# Patient Record
Sex: FEMALE | Race: WHITE | NOT HISPANIC OR LATINO
[De-identification: names, ages, dates, MRNs, and addresses within clinical notes are randomized per-mention and may not be internally consistent; named-entity substitution may affect disease eponyms.]

---

## 2017-01-18 ENCOUNTER — RX RENEWAL (OUTPATIENT)
Age: 22
End: 2017-01-18

## 2017-03-20 ENCOUNTER — APPOINTMENT (OUTPATIENT)
Dept: ENDOCRINOLOGY | Facility: CLINIC | Age: 22
End: 2017-03-20

## 2017-03-20 VITALS
OXYGEN SATURATION: 99 % | HEART RATE: 65 BPM | SYSTOLIC BLOOD PRESSURE: 122 MMHG | WEIGHT: 120 LBS | BODY MASS INDEX: 23.56 KG/M2 | DIASTOLIC BLOOD PRESSURE: 80 MMHG | HEIGHT: 60 IN

## 2017-03-20 LAB
GLUCOSE BLDC GLUCOMTR-MCNC: 170
HBA1C MFR BLD HPLC: 7

## 2017-03-21 ENCOUNTER — OTHER (OUTPATIENT)
Age: 22
End: 2017-03-21

## 2017-05-26 ENCOUNTER — RX RENEWAL (OUTPATIENT)
Age: 22
End: 2017-05-26

## 2017-07-12 ENCOUNTER — APPOINTMENT (OUTPATIENT)
Dept: ENDOCRINOLOGY | Facility: CLINIC | Age: 22
End: 2017-07-12

## 2017-07-12 VITALS — OXYGEN SATURATION: 99 % | HEIGHT: 60 IN | HEART RATE: 64 BPM

## 2017-07-12 VITALS — WEIGHT: 123 LBS | SYSTOLIC BLOOD PRESSURE: 116 MMHG | DIASTOLIC BLOOD PRESSURE: 80 MMHG | BODY MASS INDEX: 24.02 KG/M2

## 2017-07-12 LAB
ALBUMIN SERPL ELPH-MCNC: 4.4 G/DL
ALP BLD-CCNC: 74 U/L
ALT SERPL-CCNC: 20 U/L
ANION GAP SERPL CALC-SCNC: 16 MMOL/L
AST SERPL-CCNC: 21 U/L
BASOPHILS # BLD AUTO: 0.1 K/UL
BASOPHILS NFR BLD AUTO: 1.5 %
BILIRUB SERPL-MCNC: 0.4 MG/DL
BUN SERPL-MCNC: 9 MG/DL
CALCIUM SERPL-MCNC: 10.1 MG/DL
CHLORIDE SERPL-SCNC: 100 MMOL/L
CHOLEST SERPL-MCNC: 189 MG/DL
CHOLEST/HDLC SERPL: 2.1 RATIO
CO2 SERPL-SCNC: 27 MMOL/L
CREAT SERPL-MCNC: 0.88 MG/DL
CREAT SPEC-SCNC: 104 MG/DL
EOSINOPHIL # BLD AUTO: 0.24 K/UL
EOSINOPHIL NFR BLD AUTO: 3.5 %
GLUCOSE SERPL-MCNC: 129 MG/DL
HBA1C MFR BLD HPLC: 7.6 %
HCT VFR BLD CALC: 41 %
HDLC SERPL-MCNC: 89 MG/DL
HGB BLD-MCNC: 12.8 G/DL
IMM GRANULOCYTES NFR BLD AUTO: 0.1 %
LDLC SERPL CALC-MCNC: 91 MG/DL
LYMPHOCYTES # BLD AUTO: 2.31 K/UL
LYMPHOCYTES NFR BLD AUTO: 33.6 %
MAN DIFF?: NORMAL
MCHC RBC-ENTMCNC: 25.7 PG
MCHC RBC-ENTMCNC: 31.2 GM/DL
MCV RBC AUTO: 82.2 FL
MICROALBUMIN 24H UR DL<=1MG/L-MCNC: 0.6 MG/DL
MICROALBUMIN/CREAT 24H UR-RTO: 6 MG/G
MONOCYTES # BLD AUTO: 0.5 K/UL
MONOCYTES NFR BLD AUTO: 7.3 %
NEUTROPHILS # BLD AUTO: 3.72 K/UL
NEUTROPHILS NFR BLD AUTO: 54 %
PLATELET # BLD AUTO: 333 K/UL
POTASSIUM SERPL-SCNC: 5.2 MMOL/L
PROT SERPL-MCNC: 7.7 G/DL
RBC # BLD: 4.99 M/UL
RBC # FLD: 14.8 %
SODIUM SERPL-SCNC: 143 MMOL/L
T4 FREE SERPL-MCNC: 1.3 NG/DL
TRIGL SERPL-MCNC: 46 MG/DL
TSH SERPL-ACNC: 1.41 UIU/ML
TTG IGA SER IA-ACNC: 50.9 UNITS
TTG IGA SER-ACNC: POSITIVE
WBC # FLD AUTO: 6.88 K/UL

## 2017-07-19 ENCOUNTER — RX RENEWAL (OUTPATIENT)
Age: 22
End: 2017-07-19

## 2017-07-24 ENCOUNTER — RX RENEWAL (OUTPATIENT)
Age: 22
End: 2017-07-24

## 2017-08-08 ENCOUNTER — APPOINTMENT (OUTPATIENT)
Dept: INTERNAL MEDICINE | Facility: CLINIC | Age: 22
End: 2017-08-08
Payer: COMMERCIAL

## 2017-08-08 VITALS
SYSTOLIC BLOOD PRESSURE: 121 MMHG | DIASTOLIC BLOOD PRESSURE: 79 MMHG | BODY MASS INDEX: 23.22 KG/M2 | WEIGHT: 123 LBS | HEIGHT: 61 IN | OXYGEN SATURATION: 99 % | HEART RATE: 106 BPM

## 2017-08-08 VITALS — HEART RATE: 92 BPM

## 2017-08-08 PROCEDURE — 99395 PREV VISIT EST AGE 18-39: CPT

## 2017-11-22 ENCOUNTER — RX RENEWAL (OUTPATIENT)
Age: 22
End: 2017-11-22

## 2017-11-24 ENCOUNTER — MEDICATION RENEWAL (OUTPATIENT)
Age: 22
End: 2017-11-24

## 2017-12-28 ENCOUNTER — APPOINTMENT (OUTPATIENT)
Dept: ENDOCRINOLOGY | Facility: CLINIC | Age: 22
End: 2017-12-28
Payer: COMMERCIAL

## 2017-12-28 VITALS
HEIGHT: 61 IN | WEIGHT: 119 LBS | OXYGEN SATURATION: 98 % | DIASTOLIC BLOOD PRESSURE: 78 MMHG | HEART RATE: 81 BPM | BODY MASS INDEX: 22.47 KG/M2 | SYSTOLIC BLOOD PRESSURE: 118 MMHG

## 2017-12-28 LAB
GLUCOSE BLDC GLUCOMTR-MCNC: 151
HBA1C MFR BLD HPLC: 7.4

## 2017-12-28 PROCEDURE — 83036 HEMOGLOBIN GLYCOSYLATED A1C: CPT | Mod: QW

## 2017-12-28 PROCEDURE — 82962 GLUCOSE BLOOD TEST: CPT

## 2017-12-28 PROCEDURE — 99214 OFFICE O/P EST MOD 30 MIN: CPT

## 2018-03-19 ENCOUNTER — APPOINTMENT (OUTPATIENT)
Dept: ENDOCRINOLOGY | Facility: CLINIC | Age: 23
End: 2018-03-19
Payer: COMMERCIAL

## 2018-03-19 VITALS
HEART RATE: 73 BPM | BODY MASS INDEX: 22.09 KG/M2 | WEIGHT: 117 LBS | SYSTOLIC BLOOD PRESSURE: 110 MMHG | DIASTOLIC BLOOD PRESSURE: 70 MMHG | HEIGHT: 61 IN | OXYGEN SATURATION: 98 %

## 2018-03-19 LAB
GLUCOSE BLDC GLUCOMTR-MCNC: 207
HBA1C MFR BLD HPLC: 7.1

## 2018-03-19 PROCEDURE — 82962 GLUCOSE BLOOD TEST: CPT

## 2018-03-19 PROCEDURE — 83036 HEMOGLOBIN GLYCOSYLATED A1C: CPT | Mod: QW

## 2018-03-19 PROCEDURE — 99214 OFFICE O/P EST MOD 30 MIN: CPT | Mod: 25

## 2018-07-02 ENCOUNTER — APPOINTMENT (OUTPATIENT)
Dept: ENDOCRINOLOGY | Facility: CLINIC | Age: 23
End: 2018-07-02
Payer: COMMERCIAL

## 2018-07-02 VITALS
DIASTOLIC BLOOD PRESSURE: 80 MMHG | SYSTOLIC BLOOD PRESSURE: 112 MMHG | HEIGHT: 61 IN | BODY MASS INDEX: 22.84 KG/M2 | HEART RATE: 84 BPM | OXYGEN SATURATION: 96 % | WEIGHT: 121 LBS

## 2018-07-02 LAB
GLUCOSE BLDC GLUCOMTR-MCNC: 94
HBA1C MFR BLD HPLC: 7.8

## 2018-07-02 PROCEDURE — 99214 OFFICE O/P EST MOD 30 MIN: CPT

## 2018-07-03 LAB
ALBUMIN SERPL ELPH-MCNC: 4.4 G/DL
ALP BLD-CCNC: 64 U/L
ALT SERPL-CCNC: 27 U/L
ANION GAP SERPL CALC-SCNC: 17 MMOL/L
AST SERPL-CCNC: 41 U/L
BILIRUB SERPL-MCNC: 0.7 MG/DL
BUN SERPL-MCNC: 12 MG/DL
CALCIUM SERPL-MCNC: 10.3 MG/DL
CHLORIDE SERPL-SCNC: 98 MMOL/L
CHOLEST SERPL-MCNC: 179 MG/DL
CHOLEST/HDLC SERPL: 2.1 RATIO
CO2 SERPL-SCNC: 23 MMOL/L
CREAT SERPL-MCNC: 0.89 MG/DL
GLUCOSE SERPL-MCNC: 82 MG/DL
HDLC SERPL-MCNC: 85 MG/DL
LDLC SERPL CALC-MCNC: 84 MG/DL
POTASSIUM SERPL-SCNC: 4.4 MMOL/L
PROT SERPL-MCNC: 7.7 G/DL
SODIUM SERPL-SCNC: 138 MMOL/L
T4 FREE SERPL-MCNC: 1.4 NG/DL
TRIGL SERPL-MCNC: 48 MG/DL
TSH SERPL-ACNC: 1.85 UIU/ML
TTG IGA SER IA-ACNC: 86 UNITS
TTG IGA SER-ACNC: POSITIVE

## 2018-11-21 ENCOUNTER — APPOINTMENT (OUTPATIENT)
Dept: ENDOCRINOLOGY | Facility: CLINIC | Age: 23
End: 2018-11-21
Payer: COMMERCIAL

## 2018-11-21 VITALS
WEIGHT: 118 LBS | OXYGEN SATURATION: 98 % | SYSTOLIC BLOOD PRESSURE: 120 MMHG | HEART RATE: 70 BPM | HEIGHT: 61 IN | DIASTOLIC BLOOD PRESSURE: 80 MMHG | BODY MASS INDEX: 22.28 KG/M2

## 2018-11-21 PROCEDURE — 99214 OFFICE O/P EST MOD 30 MIN: CPT

## 2018-11-21 RX ORDER — BLOOD-GLUCOSE METER
KIT MISCELLANEOUS
Qty: 500 | Refills: 1 | Status: ACTIVE | COMMUNITY
Start: 2018-11-21 | End: 1900-01-01

## 2018-11-26 LAB
GLUCOSE BLDC GLUCOMTR-MCNC: 163
HBA1C MFR BLD HPLC: 7.2

## 2019-02-28 ENCOUNTER — RX RENEWAL (OUTPATIENT)
Age: 24
End: 2019-02-28

## 2019-03-18 ENCOUNTER — APPOINTMENT (OUTPATIENT)
Dept: ENDOCRINOLOGY | Facility: CLINIC | Age: 24
End: 2019-03-18
Payer: COMMERCIAL

## 2019-03-18 VITALS
BODY MASS INDEX: 21.9 KG/M2 | HEIGHT: 61 IN | SYSTOLIC BLOOD PRESSURE: 112 MMHG | OXYGEN SATURATION: 99 % | WEIGHT: 116 LBS | DIASTOLIC BLOOD PRESSURE: 80 MMHG | HEART RATE: 62 BPM

## 2019-03-18 LAB
GLUCOSE BLDC GLUCOMTR-MCNC: 95
HBA1C MFR BLD HPLC: 7.6

## 2019-03-18 PROCEDURE — 99214 OFFICE O/P EST MOD 30 MIN: CPT

## 2019-03-18 NOTE — ASSESSMENT
[FreeTextEntry1] : Ms. IVANA CHANEY is 24 year old woman w/ Type 1 DM and celiac disease: \par \par #! Type 1 DM\par A1c 7.6%, above target of less <7.0 but improved. FS running higher towards dinner time.  Will adjust ICR during lunch today.  She does have hypoglycemia in the setting of missing meals in the morning as well.  \par Short Acting Insulin: Novolog \par Basal Rate: \par Start Time: 0:00 Basal: 1.2 units/hour \par Start Time: 0500 Basal: 1.15 units/hour \par Start Time: 0800 Basal: 1.1 units/hour \par Start Time: 1500 Basal: 1.25 units/hour \par Start Time: 1800 Basal: 1.3 units/hour \par Carbohydate ratio:\par 0:00  10\par 11:00 12\par 14:00  9      \par Correction Insulin: (Blood Glucose Minus Target) divided by sensitivity factor \par BG Target = 100-115 \par Insulin Sensitivity Factor = 55 \par Insulin on Board (IOB) Duration = 4 hours \par Pt. counseled on dietary modification and eating carb consistent diet. Instructed to incorporate in exercise most days of the week for at least 30 min.\par Diabetic foot exam Up to date (3/2018)\par Diabetic eye exam up to date. \par Spoke about Medtronc 670G close loop system as well as Dexcom G6 and T-slim close loop system.  She will look into it. \par \par #2 Autoimmune disease screening\par Check TSH, free T4 next visit\par \par #3 Celiac disease\par Stable.  \par \par RTC 3 mths\par  [Carbohydrate Consistent Diet] : carbohydrate consistent diet [Hypoglycemia Management] : hypoglycemia management [Glucagon Use] : glucagon use [Ketone Testing] : ketone testing [Long Term Vascular Complications] : long term vascular complications of diabetes [Diabetes Foot Care] : diabetes foot care [Importance of Diet and Exercise] : importance of diet and exercise to improve glycemic control, achieve weight loss and improve cardiovascular health [Action and use of Insulin] : action and use of short and long-acting insulin [Insulin Self-Administration] : insulin self-administration [Self Monitoring of Blood Glucose] : self monitoring of blood glucose [Injection Technique, Storage, Sharps Disposal] : injection technique, storage, and sharps disposal [Retinopathy Screening] : Patient was referred to ophthalmology for retinopathy screening

## 2019-03-18 NOTE — THERAPY
[Novolog] : Novolog [_____] :  [unfilled] units/hour [BG Target = ____] : BG Target = [unfilled] [Insulin Sensitivity Factor = ____] : Insulin Sensitivity Factor = [unfilled] [Insulin on Board (IOB) Duration = ____ hours] : Insulin on Board (IOB) Duration  = [unfilled] hours

## 2019-03-18 NOTE — HISTORY OF PRESENT ILLNESS
[FreeTextEntry1] : 24 y.o. woman w/ DM1 here for f/u. She was diagnosed with Type 1 DM since 13 months of ago.  \par She has been on the medtronic pump since 2007.  She has been checking FS's TID: (few readings on the pumps), states that she doesn't enter it sometimes onto the pump.  Is home from college during spring break.  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 \par She is graduating this year.  She watches her diet. She eats 3 meals a day. Breakfast usually a yogurt and coffee. Lunch is usually salad and soup. She'll snack on an apple. Dinner is usually a sandwich. \par \par She is up to date with opthalmology.  She states she has no active issues with  her feet.  \par \par She has a older generation of Dexcom Sensor but she doesn't like to use.  She has not look into newer sensor yet but did expressed interest. \par \par Regarding celiac disease, she has been avoiding gluten and is doing well.  No exacerbation.

## 2019-03-19 LAB
CREAT SPEC-SCNC: 23 MG/DL
MICROALBUMIN 24H UR DL<=1MG/L-MCNC: <1.2 MG/DL
MICROALBUMIN/CREAT 24H UR-RTO: NORMAL MG/G

## 2019-04-11 ENCOUNTER — RX RENEWAL (OUTPATIENT)
Age: 24
End: 2019-04-11

## 2019-06-04 ENCOUNTER — RX RENEWAL (OUTPATIENT)
Age: 24
End: 2019-06-04

## 2019-06-14 ENCOUNTER — RX RENEWAL (OUTPATIENT)
Age: 24
End: 2019-06-14

## 2019-07-08 ENCOUNTER — OTHER (OUTPATIENT)
Age: 24
End: 2019-07-08

## 2019-07-09 LAB
T4 FREE SERPL-MCNC: 1.1 NG/DL
TSH SERPL-ACNC: 2.36 UIU/ML

## 2019-07-14 LAB
25(OH)D3 SERPL-MCNC: 26.4 NG/ML
ALBUMIN SERPL ELPH-MCNC: 4.7 G/DL
ALP BLD-CCNC: 67 U/L
ALT SERPL-CCNC: 18 U/L
ANION GAP SERPL CALC-SCNC: 18 MMOL/L
AST SERPL-CCNC: 28 U/L
BILIRUB SERPL-MCNC: 1 MG/DL
BUN SERPL-MCNC: 8 MG/DL
CALCIUM SERPL-MCNC: 10.1 MG/DL
CHLORIDE SERPL-SCNC: 102 MMOL/L
CHOLEST SERPL-MCNC: 205 MG/DL
CHOLEST/HDLC SERPL: 1.9 RATIO
CO2 SERPL-SCNC: 22 MMOL/L
CREAT SERPL-MCNC: 0.72 MG/DL
GLUCOSE SERPL-MCNC: 60 MG/DL
HDLC SERPL-MCNC: 110 MG/DL
LDLC SERPL CALC-MCNC: 81 MG/DL
POTASSIUM SERPL-SCNC: 5.8 MMOL/L
PROT SERPL-MCNC: 7.3 G/DL
SODIUM SERPL-SCNC: 142 MMOL/L
TRIGL SERPL-MCNC: 69 MG/DL

## 2019-07-15 ENCOUNTER — APPOINTMENT (OUTPATIENT)
Dept: ENDOCRINOLOGY | Facility: CLINIC | Age: 24
End: 2019-07-15
Payer: COMMERCIAL

## 2019-07-15 VITALS
HEIGHT: 61 IN | WEIGHT: 117 LBS | HEART RATE: 70 BPM | SYSTOLIC BLOOD PRESSURE: 120 MMHG | DIASTOLIC BLOOD PRESSURE: 80 MMHG | OXYGEN SATURATION: 98 % | BODY MASS INDEX: 22.09 KG/M2

## 2019-07-15 PROCEDURE — 99214 OFFICE O/P EST MOD 30 MIN: CPT

## 2019-07-17 ENCOUNTER — APPOINTMENT (OUTPATIENT)
Dept: ENDOCRINOLOGY | Facility: CLINIC | Age: 24
End: 2019-07-17
Payer: COMMERCIAL

## 2019-07-17 PROCEDURE — G0108 DIAB MANAGE TRN  PER INDIV: CPT

## 2019-07-17 NOTE — PHYSICAL EXAM
[Alert] : alert [No Acute Distress] : no acute distress [Well Nourished] : well nourished [Well Developed] : well developed [Normal Sclera/Conjunctiva] : normal sclera/conjunctiva [EOMI] : extra ocular movement intact [No Proptosis] : no proptosis [Normal Oropharynx] : the oropharynx was normal [Thyroid Not Enlarged] : the thyroid was not enlarged [No Thyroid Nodules] : there were no palpable thyroid nodules [No Respiratory Distress] : no respiratory distress [No Accessory Muscle Use] : no accessory muscle use [Clear to Auscultation] : lungs were clear to auscultation bilaterally [Normal Rate] : heart rate was normal  [Normal S1, S2] : normal S1 and S2 [Regular Rhythm] : with a regular rhythm [Pedal Pulses Normal] : the pedal pulses are present [No Edema] : there was no peripheral edema [Normal Bowel Sounds] : normal bowel sounds [Not Tender] : non-tender [Soft] : abdomen soft [Not Distended] : not distended [Post Cervical Nodes] : posterior cervical nodes [Anterior Cervical Nodes] : anterior cervical nodes [Axillary Nodes] : axillary nodes [No Spinal Tenderness] : no spinal tenderness [Spine Straight] : spine straight [No Stigmata of Cushings Syndrome] : no stigmata of cushings syndrome [Normal Gait] : normal gait [Normal Strength/Tone] : muscle strength and tone were normal [No Rash] : no rash [Normal Reflexes] : deep tendon reflexes were 2+ and symmetric [No Tremors] : no tremors [Oriented x3] : oriented to person, place, and time [Right Foot Was Examined] : right foot ~C was examined [Left Foot Was Examined] : left foot ~C was examined [Normal] : normal [Normal Sensation on Monofilament Testing] : normal sensation on monofilament testing of lower extremities [Acanthosis Nigricans] : no acanthosis nigricans

## 2019-07-17 NOTE — HISTORY OF PRESENT ILLNESS
[FreeTextEntry1] : 24 y.o. woman w/ DM1 here for f/u. She was diagnosed with Type 1 DM since 13 months of age.   \par She has been on the medtronic pump since 2007.  She has been checking FS's 4X/day. 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  She watches her diet, is gluten free. She eats 3 meals a day. Breakfast usually a yogurt and coffee. Lunch is usually salad and soup. She'll snack on an apple. Dinner is usually a sandwich, salad. Does not like to wear CGM as does not want to have another device.\par AM BG- 50s-70s. Sometimes as low as 40s in AM and has symptoms.\par pre-lunch- 120\par pre-dinner- 160s\par pre-bedtime 100-120s\par \par Last optho, >1 year ago, needs to make f/u appt.  She states she has no active issues with  her feet.  \par \par Has ketone strips, needs new glucagon. Has novolog at home.\par Regarding celiac disease, she has been avoiding gluten and is doing well.  No exacerbation.

## 2019-07-17 NOTE — REVIEW OF SYSTEMS
[Fatigue] : no fatigue [Decreased Appetite] : appetite not decreased [Recent Weight Gain (___ Lbs)] : no recent weight gain [Recent Weight Loss (___ Lbs)] : no recent weight loss [Blurry Vision] : no blurred vision [Dry Eyes] : no dryness of the eyes [Dysphagia] : no dysphagia [Dysphonia] : no dysphonia [Neck Pain] : no neck pain [Nasal Congestion] : no nasal congestion [Chest Pain] : no chest pain [Palpitations] : no palpitations [Heart Rate Is Slow] : the heart rate was not slow [Heart Rate Is Fast] : the heart rate was not fast [Lower Ext Edema] : no lower extremity edema [Shortness Of Breath] : no shortness of breath [Wheezing] : no wheezing was heard [Cough] : no cough [SOB on Exertion] : no shortness of breath during exertion [Nausea] : no nausea [Vomiting] : no vomiting was observed [Constipation] : no constipation [Diarrhea] : no diarrhea [Abdominal Pain] : no abdominal pain [Polyuria] : no polyuria [Dysuria] : no dysuria [Joint Pain] : no joint pain [Joint Stiffness] : no joint stiffness [Hair Loss] : no hair loss [Dry Skin] : no dry skin [Headache] : no headaches [Tremors] : no tremors [Dizziness] : no dizziness [Pain/Numbness of Digits] : no pain/numbness of digits [Polydipsia] : no polydipsia [Cold Intolerance] : cold tolerant [Heat Intolerance] : heat tolerant

## 2019-07-17 NOTE — ASSESSMENT
[Carbohydrate Consistent Diet] : carbohydrate consistent diet [Hypoglycemia Management] : hypoglycemia management [Glucagon Use] : glucagon use [Ketone Testing] : ketone testing [Diabetes Foot Care] : diabetes foot care [Long Term Vascular Complications] : long term vascular complications of diabetes [Importance of Diet and Exercise] : importance of diet and exercise to improve glycemic control, achieve weight loss and improve cardiovascular health [Action and use of Insulin] : action and use of short and long-acting insulin [Retinopathy Screening] : Patient was referred to ophthalmology for retinopathy screening [FreeTextEntry1] : Ms. IVANA CHANEY is 24 year old woman w/ Type 1 DM and celiac disease: \par \par #! Type 1 DM\par A1c 7.1% today, last was 7.6 in March 2019. \par Has been having hypoglycemia in AM, so will adjust overnight basal settings. Also with pre-dinner hyperglycemia so will adjust afternoon ICR as follows:\par Short Acting Insulin: Novolog \par Basal Rate: \par Start Time: 0:00 Basal: 1.10 units/hour \par Start Time: 0500 Basal: 1.10 units/hour \par Start Time: 0800 Basal: 1.05 units/hour \par Start Time: 1500 Basal: 1.25 units/hour \par Start Time: 1800 Basal: 1.3 units/hour \par Carbohydate ratio:\par 0:00  10\par 11:00 10\par 14:00  9      \par Correction Insulin: (Blood Glucose Minus Target) divided by sensitivity factor \par BG Target = 100-115 \par Insulin Sensitivity Factor = 55 \par Insulin on Board (IOB) Duration = 4 hours \par Pt. counseled on dietary modification and eating carb consistent diet. Instructed to incorporate in exercise most days of the week for at least 30 min.\par Diabetic foot exam Up to date (7/2019)\par Diabetic eye exam >1 year ago- pt to follow-up\par Pt to be meeting with CDE for new pump. Decided on Medtronic 630G.\par CMP recently done, Crt WNL, K 5.8, likely hemolyzed. Will repeat today.\par \par #2 Autoimmune disease screening\par TSH, Ft4 WNL\par \par #3 Celiac disease\par Stable. 25 vitamin D 26 \par \par RTC 3-4 mths\par \par D/W Dr. Easton\par

## 2019-07-18 ENCOUNTER — RX RENEWAL (OUTPATIENT)
Age: 24
End: 2019-07-18

## 2019-07-18 LAB
ANION GAP SERPL CALC-SCNC: 13 MMOL/L
BUN SERPL-MCNC: 9 MG/DL
CALCIUM SERPL-MCNC: 9.8 MG/DL
CHLORIDE SERPL-SCNC: 98 MMOL/L
CO2 SERPL-SCNC: 24 MMOL/L
CREAT SERPL-MCNC: 0.71 MG/DL
GLUCOSE BLDC GLUCOMTR-MCNC: 104
GLUCOSE BLDC GLUCOMTR-MCNC: 63
GLUCOSE SERPL-MCNC: 201 MG/DL
HBA1C MFR BLD HPLC: 7.1
POTASSIUM SERPL-SCNC: 5.4 MMOL/L
SODIUM SERPL-SCNC: 135 MMOL/L

## 2019-07-18 RX ORDER — BLOOD SUGAR DIAGNOSTIC
STRIP MISCELLANEOUS
Qty: 500 | Refills: 1 | Status: ACTIVE | COMMUNITY
Start: 2018-11-21 | End: 1900-01-01

## 2019-11-18 ENCOUNTER — APPOINTMENT (OUTPATIENT)
Dept: ENDOCRINOLOGY | Facility: CLINIC | Age: 24
End: 2019-11-18
Payer: COMMERCIAL

## 2019-11-18 VITALS
WEIGHT: 127 LBS | DIASTOLIC BLOOD PRESSURE: 70 MMHG | HEART RATE: 68 BPM | OXYGEN SATURATION: 98 % | HEIGHT: 61 IN | BODY MASS INDEX: 23.98 KG/M2 | SYSTOLIC BLOOD PRESSURE: 110 MMHG

## 2019-11-18 LAB
GLUCOSE BLDC GLUCOMTR-MCNC: 107
HBA1C MFR BLD HPLC: 7.3

## 2019-11-18 PROCEDURE — 82962 GLUCOSE BLOOD TEST: CPT

## 2019-11-18 PROCEDURE — 99214 OFFICE O/P EST MOD 30 MIN: CPT | Mod: 25

## 2019-11-18 PROCEDURE — 83036 HEMOGLOBIN GLYCOSYLATED A1C: CPT | Mod: QW

## 2019-11-18 NOTE — THERAPY
[Novolog] : Novolog [BG Target = ____] : BG Target = [unfilled] [_____] :  [unfilled] units/hour [Insulin Sensitivity Factor = ____] : Insulin Sensitivity Factor = [unfilled] [Insulin on Board (IOB) Duration = ____ hours] : Insulin on Board (IOB) Duration  = [unfilled] hours

## 2019-11-20 NOTE — REVIEW OF SYSTEMS
[Fatigue] : no fatigue [Decreased Appetite] : appetite not decreased [Recent Weight Gain (___ Lbs)] : no recent weight gain [Recent Weight Loss (___ Lbs)] : no recent weight loss [Blurry Vision] : no blurred vision [Dysphagia] : no dysphagia [Dry Eyes] : no dryness of the eyes [Dysphonia] : no dysphonia [Neck Pain] : no neck pain [Nasal Congestion] : no nasal congestion [Heart Rate Is Slow] : the heart rate was not slow [Palpitations] : no palpitations [Chest Pain] : no chest pain [Heart Rate Is Fast] : the heart rate was not fast [Lower Ext Edema] : no lower extremity edema [Shortness Of Breath] : no shortness of breath [Cough] : no cough [Wheezing] : no wheezing was heard [SOB on Exertion] : no shortness of breath during exertion [Nausea] : no nausea [Vomiting] : no vomiting was observed [Constipation] : no constipation [Diarrhea] : no diarrhea [Polyuria] : no polyuria [Dysuria] : no dysuria [Abdominal Pain] : no abdominal pain [Joint Pain] : no joint pain [Joint Stiffness] : no joint stiffness [Hair Loss] : no hair loss [Headache] : no headaches [Dry Skin] : no dry skin [Dizziness] : no dizziness [Pain/Numbness of Digits] : no pain/numbness of digits [Tremors] : no tremors [Polydipsia] : no polydipsia [Cold Intolerance] : cold tolerant [Heat Intolerance] : heat tolerant

## 2019-11-20 NOTE — HISTORY OF PRESENT ILLNESS
[FreeTextEntry1] : 24 y.o. woman w/ DM1 here for f/u. She was diagnosed with Type 1 DM since 13 months of age.   \par She is currently on medtronic minimed 630G.  She has been checking FS's 4X/day. Just graduated college, visual arts major.  She watches her diet, is gluten free. She eats 3 meals a day. Breakfast usually a yogurt and coffee. Lunch is usually salad and soup. She'll snack on an apple. Dinner is usually a sandwich, salad. Does not like to wear CGM as does not want to have another device.\par AM BG- 80-130s \par pre-lunch- 130s\par pre-dinner- 130-200s\par pre-bedtime checks rarely 90s-180s\par Denies anymore hypoglycemia.\par \par Last optho, 3 mos ago, no retinopathy. She states she has no active issues with  her feet.  \par \par Has ketone strips, needs new glucagon. Has lantus at home.\par Regarding celiac disease, she has been avoiding gluten and is doing well.  No exacerbation.

## 2019-11-20 NOTE — PHYSICAL EXAM
[Alert] : alert [No Acute Distress] : no acute distress [Well Developed] : well developed [Well Nourished] : well nourished [EOMI] : extra ocular movement intact [Normal Sclera/Conjunctiva] : normal sclera/conjunctiva [Normal Oropharynx] : the oropharynx was normal [No Proptosis] : no proptosis [Thyroid Not Enlarged] : the thyroid was not enlarged [No Thyroid Nodules] : there were no palpable thyroid nodules [No Accessory Muscle Use] : no accessory muscle use [No Respiratory Distress] : no respiratory distress [Clear to Auscultation] : lungs were clear to auscultation bilaterally [Normal Rate] : heart rate was normal  [Normal S1, S2] : normal S1 and S2 [Regular Rhythm] : with a regular rhythm [Pedal Pulses Normal] : the pedal pulses are present [No Edema] : there was no peripheral edema [Normal Bowel Sounds] : normal bowel sounds [Not Tender] : non-tender [Soft] : abdomen soft [Not Distended] : not distended [Anterior Cervical Nodes] : anterior cervical nodes [Axillary Nodes] : axillary nodes [Post Cervical Nodes] : posterior cervical nodes [No Spinal Tenderness] : no spinal tenderness [Spine Straight] : spine straight [No Stigmata of Cushings Syndrome] : no stigmata of cushings syndrome [Normal Gait] : normal gait [Normal Strength/Tone] : muscle strength and tone were normal [No Rash] : no rash [Right Foot Was Examined] : right foot ~C was examined [Left Foot Was Examined] : left foot ~C was examined [Normal] : normal [Normal Reflexes] : deep tendon reflexes were 2+ and symmetric [No Tremors] : no tremors [Normal Sensation on Monofilament Testing] : normal sensation on monofilament testing of lower extremities [Oriented x3] : oriented to person, place, and time [Acanthosis Nigricans] : no acanthosis nigricans

## 2019-11-20 NOTE — ASSESSMENT
[Carbohydrate Consistent Diet] : carbohydrate consistent diet [Hypoglycemia Management] : hypoglycemia management [Ketone Testing] : ketone testing [Glucagon Use] : glucagon use [Diabetes Foot Care] : diabetes foot care [Long Term Vascular Complications] : long term vascular complications of diabetes [Importance of Diet and Exercise] : importance of diet and exercise to improve glycemic control, achieve weight loss and improve cardiovascular health [Self Monitoring of Blood Glucose] : self monitoring of blood glucose [Retinopathy Screening] : Patient was referred to ophthalmology for retinopathy screening [FreeTextEntry1] : Ms. IVANA CHANEY is 24 year old woman w/ Type 1 DM and celiac disease: \par \par #! Type 1 DM\par A1c 7.3% today, last was 7.1 in July 2019. \par Patient with increased BG pre-dinner and bedtime. Will increase ICR at 2pm from 1:9 to 1:8.\par C/w current basal settings.\par Short Acting Insulin: Novolog \par Basal Rate: \par Start Time: 0:00 Basal: 1.10 units/hour \par Start Time: 0500 Basal: 1.10 units/hour \par Start Time: 0800 Basal: 1.05 units/hour \par Start Time: 1500 Basal: 1.25 units/hour \par Start Time: 1800 Basal: 1.3 units/hour \par Carbohydate ratio:\par 0:00  10\par 14:00  8      \par Correction Insulin: (Blood Glucose Minus Target) divided by sensitivity factor \par BG Target = 100-115 \par Insulin Sensitivity Factor = 55 \par Insulin on Board (IOB) Duration = 4 hours \par Pt. counseled on dietary modification and eating carb consistent diet. Instructed to incorporate in exercise most days of the week for at least 30 min.\par Diabetic foot exam Up to date (7/2019)\par Diabetic eye exam UTD\par \par #2 Autoimmune disease screening\par TSH, Ft4 WNL July 2019\par \par #3 Celiac disease\par Stable. 25 vitamin D 26 \par \par RTC 3\par \par D/W Dr. Easton\par

## 2019-12-16 ENCOUNTER — RX RENEWAL (OUTPATIENT)
Age: 24
End: 2019-12-16

## 2020-01-28 ENCOUNTER — RX RENEWAL (OUTPATIENT)
Age: 25
End: 2020-01-28

## 2020-03-09 ENCOUNTER — APPOINTMENT (OUTPATIENT)
Dept: ENDOCRINOLOGY | Facility: CLINIC | Age: 25
End: 2020-03-09
Payer: COMMERCIAL

## 2020-03-09 VITALS
OXYGEN SATURATION: 98 % | DIASTOLIC BLOOD PRESSURE: 80 MMHG | BODY MASS INDEX: 23.22 KG/M2 | WEIGHT: 123 LBS | HEART RATE: 75 BPM | HEIGHT: 61 IN | SYSTOLIC BLOOD PRESSURE: 110 MMHG

## 2020-03-09 PROCEDURE — 99214 OFFICE O/P EST MOD 30 MIN: CPT

## 2020-03-09 NOTE — HISTORY OF PRESENT ILLNESS
[___] : [unfilled] [FreeTextEntry1] : 25 y.o. woman w/ DM1 here for f/u. She was diagnosed with Type 1 DM since 13 months of age.\par \par She has been on the medtronic pump since 2007.  She has been checking FS's TID: (few readings on the pumps), states that she doesn't enter it sometimes onto the pump.  She graduated from  [employer]. \par \par She is up to date with opthalmology.  She states she has no active issues with  her feet.  \par \par She has a older generation of Dexcom Sensor but she doesn't like to use.  She has not look into newer sensor yet but did expressed interest.  She really does not want to wear sensor if it is possible.  She checks her sugar 3-4 times daily and sometimes even more.\par \par She reports that recently her glucose has been consistently high.  A lot of morning she is waking up in 200s range.  In review of her insulin pump.  Looks like patient sometimes gives herself manual boluses, via entering carbohydrate intake, to receive insulin.  She denies any hypoglycemia.\par \par Regarding celiac disease, she has been avoiding gluten and is doing well.  No exacerbation.

## 2020-03-09 NOTE — REASON FOR VISIT
[Follow-Up: _____] : a [unfilled] follow-up visit [FreeTextEntry1] : Type 1 diabetes mellitus, celiac disease

## 2020-03-09 NOTE — ASSESSMENT
[FreeTextEntry1] : Ms. IVANA CHANEY is 24 year old woman w/ Type 1 DM and celiac disease: \par \par #! Type 1 DM\par A1c 7.6%, above target of less <7.0 but improved. FS running higher towards dinner time.  Will adjust ICR during lunch today.  She does have hypoglycemia in the setting of missing meals in the morning as well.  \par Short Acting Insulin: Novolog \par Basal Rate: \par Start Time: 0:00 Basal: 1.2 units/hour (from 1.1)\par Start Time: 0500 Basal: 1.15 units/hour  (1.05)\par Start Time: 0800 Basal: 1.1 units/hour \par Start Time: 1500 Basal: 1.25 units/hour \par Start Time: 1800 Basal: 1.3 units/hour \par Carbohydate ratio:\par 0:00  10\par 14:00  8      \par Correction Insulin: (Blood Glucose Minus Target) divided by sensitivity factor \par BG Target = 100-115 \par Insulin Sensitivity Factor = 55 \par Insulin on Board (IOB) Duration = 4 hours \par Pt. counseled on dietary modification and eating carb consistent diet. Instructed to incorporate in exercise most days of the week for at least 30 min.\par Diabetic foot exam Up to date 03/09/2020 \par Diabetic eye exam up to date. \par Spoke about Medtronc 670G close loop system as well as Dexcom G6 and T-slim close loop system.  She will look into it again. \par \par #2 Autoimmune disease screening\par Check TSH, free T4 next visit\par \par #3 Celiac disease\par Stable.  \par \par #4  Vitamin D Deficiency\par Continue with vitamin D supplement OTC 1000 IU daily.\par \par RTC 3 mths\par

## 2020-03-10 LAB
CREAT SPEC-SCNC: 133 MG/DL
GLUCOSE BLDC GLUCOMTR-MCNC: 90
HBA1C MFR BLD HPLC: 8
MICROALBUMIN 24H UR DL<=1MG/L-MCNC: 2.5 MG/DL
MICROALBUMIN/CREAT 24H UR-RTO: 19 MG/G

## 2020-06-17 LAB
ALBUMIN SERPL ELPH-MCNC: 4.5 G/DL
ALP BLD-CCNC: 83 U/L
ALT SERPL-CCNC: 19 U/L
ANION GAP SERPL CALC-SCNC: 19 MMOL/L
AST SERPL-CCNC: 23 U/L
BILIRUB SERPL-MCNC: 0.3 MG/DL
BUN SERPL-MCNC: 9 MG/DL
CALCIUM SERPL-MCNC: 9.6 MG/DL
CHLORIDE SERPL-SCNC: 101 MMOL/L
CO2 SERPL-SCNC: 20 MMOL/L
CREAT SERPL-MCNC: 0.74 MG/DL
ESTIMATED AVERAGE GLUCOSE: 163 MG/DL
GLUCOSE SERPL-MCNC: 184 MG/DL
HBA1C MFR BLD HPLC: 7.3 %
POTASSIUM SERPL-SCNC: 4.7 MMOL/L
PROT SERPL-MCNC: 6.9 G/DL
SODIUM SERPL-SCNC: 140 MMOL/L
T3FREE SERPL-MCNC: 3.19 PG/ML
THYROPEROXIDASE AB SERPL IA-ACNC: 14 IU/ML
TSH SERPL-ACNC: 1.09 UIU/ML

## 2020-06-22 ENCOUNTER — APPOINTMENT (OUTPATIENT)
Dept: ENDOCRINOLOGY | Facility: CLINIC | Age: 25
End: 2020-06-22
Payer: COMMERCIAL

## 2020-06-22 VITALS
SYSTOLIC BLOOD PRESSURE: 120 MMHG | OXYGEN SATURATION: 97 % | WEIGHT: 127 LBS | HEART RATE: 61 BPM | HEIGHT: 61 IN | DIASTOLIC BLOOD PRESSURE: 80 MMHG | TEMPERATURE: 98.9 F | BODY MASS INDEX: 23.98 KG/M2

## 2020-06-22 PROCEDURE — 99214 OFFICE O/P EST MOD 30 MIN: CPT

## 2020-06-22 NOTE — PHYSICAL EXAM
[Alert] : alert [Well Nourished] : well nourished [No Acute Distress] : no acute distress [Well Developed] : well developed [EOMI] : extra ocular movement intact [No Proptosis] : no proptosis [Normal Sclera/Conjunctiva] : normal sclera/conjunctiva [Thyroid Not Enlarged] : the thyroid was not enlarged [Normal Oropharynx] : the oropharynx was normal [No Thyroid Nodules] : no palpable thyroid nodules [No Respiratory Distress] : no respiratory distress [No Accessory Muscle Use] : no accessory muscle use [Clear to Auscultation] : lungs were clear to auscultation bilaterally [Normal S1, S2] : normal S1 and S2 [Regular Rhythm] : with a regular rhythm [No Edema] : no peripheral edema [Normal Rate] : heart rate was normal [Normal Bowel Sounds] : normal bowel sounds [Pedal Pulses Normal] : the pedal pulses are present [Not Tender] : non-tender [Soft] : abdomen soft [Not Distended] : not distended [Normal Anterior Cervical Nodes] : no anterior cervical lymphadenopathy [Normal Posterior Cervical Nodes] : no posterior cervical lymphadenopathy [Spine Straight] : spine straight [No Spinal Tenderness] : no spinal tenderness [Normal Gait] : normal gait [No Stigmata of Cushings Syndrome] : no stigmata of Cushings Syndrome [Normal Strength/Tone] : muscle strength and tone were normal [No Rash] : no rash [No Tremors] : no tremors [Normal Reflexes] : deep tendon reflexes were 2+ and symmetric [Oriented x3] : oriented to person, place, and time [Acanthosis Nigricans] : no acanthosis nigricans

## 2020-06-22 NOTE — THERAPY
[Today's Date] : [unfilled] [Novolog] : Novolog [_____] :  [unfilled] mg/dL [Insulin on Board (IOB) Duration = ____ hours] : Insulin on Board (IOB) Duration  = [unfilled] hours [Max Bolus (units) = ____] : Max Bolus = [unfilled] units [de-identified] : MiniMed 630 G [FreeTextEntry7] : None

## 2020-06-22 NOTE — HISTORY OF PRESENT ILLNESS
[FreeTextEntry1] : Ms. IVANA CHANEY is a 25 year old female with Type 1 DM, diagnosed at 13 months of age, here for endocrine follow up.  \par \par She has been on the medtronic pump since 2007.  She has been checking FS's TID: (few readings on the pumps), states that she doesn't enter it sometimes onto the pump.  She graduated from  [employer]. She was an art major.  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  She states she has no active issues with her feet.  \par \par She has a older generation of Dexcom Sensor but she doesn't like to use.  She has not look into newer sensor yet but did expressed interest.  She really does not want to wear sensor if it is possible.  She checks her sugar 3-4 times daily and sometimes even more.  She uses a Medtronic 630 G insulin pump.  She have received for the for 2 years.\par \par Regarding celiac disease, she has been avoiding gluten and is doing well.  No exacerbation.  She is following up with gastroenterologist as well.  No recent flare.

## 2020-06-22 NOTE — ASSESSMENT
[Diabetes Foot Care] : diabetes foot care [Carbohydrate Consistent Diet] : carbohydrate consistent diet [Long Term Vascular Complications] : long term vascular complications of diabetes [Exercise/Effect on Glucose] : exercise/effect on glucose [Importance of Diet and Exercise] : importance of diet and exercise to improve glycemic control, achieve weight loss and improve cardiovascular health [Hypoglycemia Management] : hypoglycemia management [Glucagon Use] : glucagon use [Action and use of Insulin] : action and use of short and long-acting insulin [Ketone Testing] : ketone testing [Self Monitoring of Blood Glucose] : self monitoring of blood glucose [Insulin Self-Administration] : insulin self-administration [Sick-Day Management] : sick-day management [Injection Technique, Storage, Sharps Disposal] : injection technique, storage, and sharps disposal [Retinopathy Screening] : Patient was referred to ophthalmology for retinopathy screening [FreeTextEntry1] : Ms. IVANA CHANEY is 24 year old woman w/ Type 1 DM and celiac disease: \par \par #! Type 1 DM\par A1c 7.6% in March 2020 most recently improved to 7.3% on June 17, 2020.  Improved but still above target of less <7.0 but improved. FS running higher towards dinner time.  ICR was adjusted for lunch last time.  For now we will increase basal at 3 PM from 1.25 units/h to 1.3 units/h.  As below.  Patient is using fingerstick monitoring.  She noticed that sometimes the meter does not transmit the glucose reading into her pump.  Therefore it appears that she is missing a lot of fingerstick checking at the download flow sheets.  But patient states that she has been checking 3-4 times daily.\par \par Basal Rate: \par Start Time: 0:00 Basal: 1.2 units/hour \par Start Time: 0500 Basal: 1.15 units/hour  \par Start Time: 0800 Basal: 1.05 units/hour \par Start Time: 1500 Basal: 1.25 units/hour to 1.30 unit/hr. \par Start Time: 1800 Basal: 1.3 units/hour \par Carbohydate ratio:\par 0:00  10\par 14:00  8      \par Correction Insulin: (Blood Glucose Minus Target) divided by sensitivity factor \par BG Target = 100-115 \par Insulin Sensitivity Factor = 55 \par Insulin on Board (IOB) Duration = 4 hours \par Pt. counseled on dietary modification and eating carb consistent diet. Instructed to incorporate in exercise most days of the week for at least 30 min.\par Diabetic foot exam Up to date 03/09/2020 \par Diabetic eye exam up to date. \par Spoke about Medtronc 670G close loop system as well as Dexcom G6 and T-slim close loop system.  She will look into it again. \par \par #2 Autoimmune disease screening\par TSH, free T4 within normal limits in June 2020.  TPO antibody negative.  Can monitor once yearly.\par \par #3 Celiac disease\par Stable.  Is following up with gastroenterologist.\par \par #4  Vitamin D Deficiency\par Continue with vitamin D supplement OTC 1000 IU daily.\par Will monitor level next visit\par \par Follow-up with me in clinic in 3 months.\par

## 2020-06-26 LAB
ALBUMIN SERPL ELPH-MCNC: 4.5 G/DL
ALP BLD-CCNC: 75 U/L
ALT SERPL-CCNC: 15 U/L
ANION GAP SERPL CALC-SCNC: 14 MMOL/L
AST SERPL-CCNC: 23 U/L
BILIRUB SERPL-MCNC: 1.4 MG/DL
BUN SERPL-MCNC: 9 MG/DL
CALCIUM SERPL-MCNC: 9.3 MG/DL
CHLORIDE SERPL-SCNC: 99 MMOL/L
CO2 SERPL-SCNC: 23 MMOL/L
CREAT SERPL-MCNC: 0.71 MG/DL
GLUCOSE SERPL-MCNC: 163 MG/DL
POTASSIUM SERPL-SCNC: 4.3 MMOL/L
PROT SERPL-MCNC: 6.6 G/DL
SODIUM SERPL-SCNC: 137 MMOL/L

## 2020-07-15 ENCOUNTER — RX RENEWAL (OUTPATIENT)
Age: 25
End: 2020-07-15

## 2020-08-05 LAB
ALBUMIN SERPL ELPH-MCNC: 4.5 G/DL
ALP BLD-CCNC: 66 U/L
ALT SERPL-CCNC: 17 U/L
AST SERPL-CCNC: 18 U/L
BILIRUB DIRECT SERPL-MCNC: 0.2 MG/DL
BILIRUB INDIRECT SERPL-MCNC: 0.7 MG/DL
BILIRUB SERPL-MCNC: 1 MG/DL
PROT SERPL-MCNC: 6.5 G/DL

## 2020-09-03 ENCOUNTER — RX RENEWAL (OUTPATIENT)
Age: 25
End: 2020-09-03

## 2020-09-04 ENCOUNTER — RX RENEWAL (OUTPATIENT)
Age: 25
End: 2020-09-04

## 2020-09-21 ENCOUNTER — APPOINTMENT (OUTPATIENT)
Dept: ENDOCRINOLOGY | Facility: CLINIC | Age: 25
End: 2020-09-21
Payer: COMMERCIAL

## 2020-09-21 VITALS
SYSTOLIC BLOOD PRESSURE: 110 MMHG | TEMPERATURE: 98.3 F | WEIGHT: 125 LBS | HEIGHT: 61 IN | BODY MASS INDEX: 23.6 KG/M2 | DIASTOLIC BLOOD PRESSURE: 80 MMHG

## 2020-09-21 PROCEDURE — 99214 OFFICE O/P EST MOD 30 MIN: CPT

## 2020-09-21 NOTE — ASSESSMENT
[FreeTextEntry1] : Ms. IVANA CHANEY is 24 year old woman w/ Type 1 DM and celiac disease: \par \par #! Type 1 DM\par Patient using FSG only.  On download, only about twice daiy. \par Agreed to try Deisy sensor which she placed and has shared data with our office. \par If she likes it, she will be sending me a request to refil more sensor. \par For now, continue with her current setting and make change in 2 weeks after analyzing sensor data. \par Basal Rate: \par Start Time: 0:00 Basal: 1.2 units/hour \par Start Time: 0500 Basal: 1.15 units/hour  \par Start Time: 0800 Basal: 1.05 units/hour \par Start Time: 1500 Basal: 1.25 units/hour to 1.30 unit/hr. \par Start Time: 1800 Basal: 1.3 units/hour \par Carbohydate ratio:\par 0:00  10\par 14:00  8      \par Correction Insulin: (Blood Glucose Minus Target) divided by sensitivity factor \par BG Target = 100-115 \par Insulin Sensitivity Factor = 55 \par Insulin on Board (IOB) Duration = 4 hours \par Pt. counseled on dietary modification and eating carb consistent diet. Instructed to incorporate in exercise most days of the week for at least 30 min.\par Diabetic foot exam Up to date 03/09/2020 \par Diabetic eye exam up to date. \par Spoke about Medtronc 670G close loop system as well as Dexcom G6 and T-slim close loop system.  She will look into it again. \par \par #2 Autoimmune disease screening\par TSH, free T4 within normal limits in June 2020.  TPO antibody negative.  Can monitor once yearly.\par \par #3 Celiac disease\par Stable.  Is following up with gastroenterologist.\par \par #4  Vitamin D Deficiency\par Continue with vitamin D supplement OTC 1000 IU daily.\par Will monitor level today.  \par \par Follow-up with me in clinic in 3 months.\par

## 2020-09-21 NOTE — HISTORY OF PRESENT ILLNESS
[FreeTextEntry1] : Ms. IVANA CHANEY is a 25 year old female with Type 1 DM, diagnosed at 13 months of age, here for endocrine follow up.  \par \par She has been on the medtronic pump since 2007.  She has been checking FS's TID: (few readings on the pumps), states that she doesn't enter it sometimes onto the pump.  She graduated from  [employer]. She was an art major.  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  She states she has no active issues with her feet.  \par \par She has a older generation of Dexcom Sensor but she doesn't like to use.  She has not look into newer sensor yet but did expressed interest.  She really does not want to wear sensor if it is possible.  She checks her sugar 3-4 times daily and sometimes even more.  She uses a Medtronic 630 G insulin pump.  She have received for the for 2 years.\par \par Regarding celiac disease, she has been avoiding gluten and is doing well.  No exacerbation.  She is following up with gastroenterologist as well.  No recent flare.

## 2020-09-21 NOTE — THERAPY
[Today's Date] : [unfilled] [Novolog] : Novolog [_____] :  [unfilled] mg/dL [Insulin on Board (IOB) Duration = ____ hours] : Insulin on Board (IOB) Duration  = [unfilled] hours

## 2020-09-22 LAB
25(OH)D3 SERPL-MCNC: 24.7 NG/ML
ABO + RH PNL BLD: NORMAL
ESTIMATED AVERAGE GLUCOSE: 157 MG/DL
HBA1C MFR BLD HPLC: 7.1 %

## 2021-01-04 ENCOUNTER — APPOINTMENT (OUTPATIENT)
Dept: ENDOCRINOLOGY | Facility: CLINIC | Age: 26
End: 2021-01-04

## 2021-01-05 ENCOUNTER — RX RENEWAL (OUTPATIENT)
Age: 26
End: 2021-01-05

## 2021-02-23 ENCOUNTER — RX RENEWAL (OUTPATIENT)
Age: 26
End: 2021-02-23

## 2021-02-24 ENCOUNTER — NON-APPOINTMENT (OUTPATIENT)
Age: 26
End: 2021-02-24

## 2021-03-02 ENCOUNTER — APPOINTMENT (OUTPATIENT)
Dept: ENDOCRINOLOGY | Facility: CLINIC | Age: 26
End: 2021-03-02
Payer: COMMERCIAL

## 2021-03-02 LAB — GLUCOSE BLDC GLUCOMTR-MCNC: 185

## 2021-03-02 PROCEDURE — 99214 OFFICE O/P EST MOD 30 MIN: CPT | Mod: 25

## 2021-03-02 PROCEDURE — 95251 CONT GLUC MNTR ANALYSIS I&R: CPT

## 2021-03-02 PROCEDURE — 82962 GLUCOSE BLOOD TEST: CPT

## 2021-03-02 PROCEDURE — 99072 ADDL SUPL MATRL&STAF TM PHE: CPT

## 2021-03-02 NOTE — PHYSICAL EXAM
[Alert] : alert [Well Nourished] : well nourished [No Acute Distress] : no acute distress [Well Developed] : well developed [Normal Sclera/Conjunctiva] : normal sclera/conjunctiva [EOMI] : extra ocular movement intact [No Proptosis] : no proptosis [Normal Oropharynx] : the oropharynx was normal [Thyroid Not Enlarged] : the thyroid was not enlarged [No Thyroid Nodules] : no palpable thyroid nodules [No Respiratory Distress] : no respiratory distress [No Accessory Muscle Use] : no accessory muscle use [Clear to Auscultation] : lungs were clear to auscultation bilaterally [Normal S1, S2] : normal S1 and S2 [Normal Rate] : heart rate was normal [Regular Rhythm] : with a regular rhythm [No Edema] : no peripheral edema [Pedal Pulses Normal] : the pedal pulses are present [Normal Bowel Sounds] : normal bowel sounds [Not Tender] : non-tender [Not Distended] : not distended [Soft] : abdomen soft [Normal Anterior Cervical Nodes] : no anterior cervical lymphadenopathy [No Spinal Tenderness] : no spinal tenderness [Spine Straight] : spine straight [No Stigmata of Cushings Syndrome] : no stigmata of Cushings Syndrome [Normal Gait] : normal gait [Normal Strength/Tone] : muscle strength and tone were normal [No Rash] : no rash [Normal Reflexes] : deep tendon reflexes were 2+ and symmetric [No Tremors] : no tremors [Oriented x3] : oriented to person, place, and time [Normal] : normal [Full ROM] : with full range of motion [Acanthosis Nigricans] : no acanthosis nigricans [Diminished Throughout Both Feet] : normal tactile sensation with monofilament testing throughout both feet

## 2021-03-02 NOTE — HISTORY OF PRESENT ILLNESS
[FreeTextEntry1] : Ms. IVANA CHANEY is a 26 year old female with Type 1 DM, diagnosed at 13 months of age, here for endocrine follow up.  \par \par She has been on the medtronic pump since 2007.  She has been checking FS's TID: (few readings on the pumps), states that she doesn't enter it sometimes onto the pump.  She graduated from  [place]. She was an art major.  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  She states she has no active issues with her feet.  \par \par She has a older generation of Dexcom Sensor but she doesn't like to use.  She has not look into newer sensor yet but did expressed interest.  She really does not want to wear sensor if it is possible.  She checks her sugar 3-4 times daily and sometimes even more.  She uses a Medtronic 630 G insulin pump.  She have received for the for 2 years.\par \par Regarding celiac disease, she has been avoiding gluten and is doing well.  No exacerbation.  She is following up with gastroenterologist as well.  No recent flare.\par \par Noticed that her glucose tends to run higher before her period comes.  She is using freestyle alena.  She likes daily alena 14 days with the application versus Dexcom sensor.

## 2021-03-02 NOTE — ASSESSMENT
[FreeTextEntry1] : Ms. IVANA CHANEY is 26 year old woman w/ Type 1 DM and celiac disease: \par \par #! Type 1 DM\par a1c 03/02/2021 IS 7.0% which is within target.  \par Agreed to try Deisy sensor which she placed and has shared data with our office. \par If she likes it, she will be sending me a request to refil more sensor. \par For now, continue with her current setting and make change in 2 weeks after analyzing sensor data. \par Basal Rate: \par Start Time: 0:00 Basal: 1.2 units/hour \par Start Time: 0500 Basal: 1.15 units/hour  \par Start Time: 0800 Basal: 1.05 units/hour \par Start Time: 1500 Basal: 1.25 units/hour to 1.30 unit/hr. \par Start Time: 1800 Basal: 1.3 units/hour \par Carbohydate ratio:\par 0:00  10\par 14:00  8      \par Correction Insulin: (Blood Glucose Minus Target) divided by sensitivity factor \par BG Target = 100-115 \par Insulin Sensitivity Factor = 55 \par Insulin on Board (IOB) Duration = 4 hours \par Pt. counseled on dietary modification and eating carb consistent diet. Instructed to incorporate in exercise most days of the week for at least 30 min.\par Diabetic foot exam Up to date 03/09/2020 \par Diabetic eye exam up to date. \par \par \par #2 Autoimmune disease screening\par TSH, free T4 within normal limits in June 2020.  TPO antibody negative.  Can monitor once yearly.\par \par #3 Celiac disease\par Stable.  Is following up with gastroenterologist.\par \par #4  Vitamin D Deficiency\par Continue with vitamin D supplement OTC 1000 IU daily.\par Will monitor level today.  \par \par Follow-up with me in clinic in 3 months.\par

## 2021-03-02 NOTE — ADDENDUM
[FreeTextEntry1] : Underwent a CGM study from February 17, 2021–March 2, 2021. [14 days of uninterrupted data were downloaded\par - The reason for the study was type 1 diabetes management\par - Pt´s current therapeutic regimen includes: Insulin pump with a setting below.\par - The patient´s blood sugars were in target 72% of the time above target 18% of the time and below target 10% % of the time\par - Hypoglycemia: The patient had multiple low glucose events, with average duration of 1 hour 55 minutes minutes.  2% of these were severe (<50mg/dL or requiring assistance). The hypoglycemic events typically fasting and postprandially in the setting of over bolusing.\par - Hyperglycemia" post breakfast\par - Recommended therapeutic changes based on CGM Study: For now continue with current setting, work on carbohydrate counting.\par

## 2021-03-03 LAB
CREAT SPEC-SCNC: 161 MG/DL
MICROALBUMIN 24H UR DL<=1MG/L-MCNC: <1.2 MG/DL
MICROALBUMIN/CREAT 24H UR-RTO: NORMAL MG/G

## 2021-05-12 ENCOUNTER — RX RENEWAL (OUTPATIENT)
Age: 26
End: 2021-05-12

## 2021-07-08 ENCOUNTER — RX RENEWAL (OUTPATIENT)
Age: 26
End: 2021-07-08

## 2021-11-05 ENCOUNTER — RX RENEWAL (OUTPATIENT)
Age: 26
End: 2021-11-05

## 2021-11-29 ENCOUNTER — APPOINTMENT (OUTPATIENT)
Dept: ENDOCRINOLOGY | Facility: CLINIC | Age: 26
End: 2021-11-29
Payer: COMMERCIAL

## 2021-11-29 VITALS
OXYGEN SATURATION: 97 % | WEIGHT: 121.2 LBS | TEMPERATURE: 97.9 F | DIASTOLIC BLOOD PRESSURE: 78 MMHG | HEART RATE: 105 BPM | SYSTOLIC BLOOD PRESSURE: 120 MMHG

## 2021-11-29 DIAGNOSIS — Z00.00 ENCOUNTER FOR GENERAL ADULT MEDICAL EXAMINATION W/OUT ABNORMAL FINDINGS: ICD-10-CM

## 2021-11-29 LAB
GLUCOSE BLDC GLUCOMTR-MCNC: 132
HBA1C MFR BLD HPLC: 6.6

## 2021-11-29 PROCEDURE — 83036 HEMOGLOBIN GLYCOSYLATED A1C: CPT | Mod: QW

## 2021-11-29 PROCEDURE — 99214 OFFICE O/P EST MOD 30 MIN: CPT | Mod: 25

## 2021-11-29 PROCEDURE — 82962 GLUCOSE BLOOD TEST: CPT

## 2021-11-29 NOTE — HISTORY OF PRESENT ILLNESS
[FreeTextEntry1] : Ms. IVANA CHANEY is a 26 year old female with Type 1 DM, diagnosed at 13 months of age, here for endocrine follow up.  \par \par She has been on the medtronic pump since 2007.  She has been checking FS's TID: (few readings on the pumps), states that she doesn't enter it sometimes onto the pump.  She graduated from  [employer]. She was an art major.  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  She states she has no active issues with her feet.  \par \par She has a older generation of Dexcom Sensor but she doesn't like to use.  She has not look into newer sensor yet but did expressed interest.  She really does not want to wear sensor if it is possible.  She checks her sugar 3-4 times daily and sometimes even more.  She uses a Medtronic 630 G insulin pump.  She have received for the for 2 years.\par \par Regarding celiac disease, she has been avoiding gluten and is doing well.  No exacerbation.  She is following up with gastroenterologist as well.  No recent flare.\par \par Noticed that her glucose tends to run higher before her period comes.  She is using freestyle alena.  She likes daily alena 14 days with the arm application versus Dexcom sensor.\par \par Didn't have sensor on the last 14 days due to lack of prescriptions?

## 2021-11-29 NOTE — ASSESSMENT
[FreeTextEntry1] : Ms. IVANA CHANEY is 26 year old woman w/ Type 1 DM and celiac disease: \par \par 1. Type 1 DM\par A1C 6.6% 11/29/2021, with fasting hypoglycemia.  Will change overnight basal. \par Basal Rate: \par Novolog inuslin.  \par Start Time: 0:00 Basal: 1.2 units/hour \par Start Time: 0500 Basal: 1.15 units/hour  ==> 1.1\par Start Time: 0800 Basal: 1.05 units/hour \par Start Time: 1500 Basal: 1.25 units/hour to 1.30 unit/hr. \par Start Time: 1800 Basal: 1.3 units/hour \par Total 28.35 units/day after change.  \par Carbohydate ratio:\par 0:00  10\par 14:00  8      \par Correction Insulin: (Blood Glucose Minus Target) divided by sensitivity factor \par BG Target = 100-115 \par Insulin Sensitivity Factor = 55 \par Insulin on Board (IOB) Duration = 4 hours \par Pt. counseled on dietary modification and eating carb consistent diet. Instructed to incorporate in exercise most days of the week for at least 30 min.\par Diabetic foot exam Up to date \par Diabetic eye exam up to date, due for summer 2021, will schedule an appointment soon. \par \par \par 2.  Autoimmune disease screening\par TSH, free T4 within normal limits in June 2020.  TPO antibody negative.  Can monitor once yearly.\par \par 3.  Celiac disease\par Stable.  Is following up with gastroenterologist.\par \par 4.  Vitamin D deficiency\par Continue with vitamin D supplement OTC 1000 IU daily.\par Will monitor level today.  \par \par FU in 4 months with Ivana.  \par  [Diabetes Foot Care] : diabetes foot care [Long Term Vascular Complications] : long term vascular complications of diabetes [Carbohydrate Consistent Diet] : carbohydrate consistent diet [Importance of Diet and Exercise] : importance of diet and exercise to improve glycemic control, achieve weight loss and improve cardiovascular health [Exercise/Effect on Glucose] : exercise/effect on glucose [Hypoglycemia Management] : hypoglycemia management [Glucagon Use] : glucagon use [Ketone Testing] : ketone testing [Action and use of Insulin] : action and use of short and long-acting insulin [Self Monitoring of Blood Glucose] : self monitoring of blood glucose [Insulin Self-Administration] : insulin self-administration [Injection Technique, Storage, Sharps Disposal] : injection technique, storage, and sharps disposal [Sick-Day Management] : sick-day management [Retinopathy Screening] : Patient was referred to ophthalmology for retinopathy screening

## 2021-12-03 DIAGNOSIS — R17 UNSPECIFIED JAUNDICE: ICD-10-CM

## 2021-12-03 LAB
25(OH)D3 SERPL-MCNC: 20.4 NG/ML
ALBUMIN SERPL ELPH-MCNC: 4.7 G/DL
ALP BLD-CCNC: 70 U/L
ALT SERPL-CCNC: 15 U/L
ANION GAP SERPL CALC-SCNC: 16 MMOL/L
AST SERPL-CCNC: 23 U/L
BASOPHILS # BLD AUTO: 0.06 K/UL
BASOPHILS NFR BLD AUTO: 0.6 %
BILIRUB SERPL-MCNC: 1.9 MG/DL
BUN SERPL-MCNC: 9 MG/DL
CALCIUM SERPL-MCNC: 9.9 MG/DL
CHLORIDE SERPL-SCNC: 99 MMOL/L
CHOLEST SERPL-MCNC: 175 MG/DL
CO2 SERPL-SCNC: 20 MMOL/L
CREAT SERPL-MCNC: 0.64 MG/DL
EOSINOPHIL # BLD AUTO: 0.05 K/UL
EOSINOPHIL NFR BLD AUTO: 0.5 %
GLUCOSE SERPL-MCNC: 159 MG/DL
HCT VFR BLD CALC: 45.4 %
HDLC SERPL-MCNC: 96 MG/DL
HGB BLD-MCNC: 14.6 G/DL
IMM GRANULOCYTES NFR BLD AUTO: 0.3 %
LDLC SERPL CALC-MCNC: 68 MG/DL
LYMPHOCYTES # BLD AUTO: 1.53 K/UL
LYMPHOCYTES NFR BLD AUTO: 16.3 %
MAN DIFF?: NORMAL
MCHC RBC-ENTMCNC: 29.8 PG
MCHC RBC-ENTMCNC: 32.2 GM/DL
MCV RBC AUTO: 92.7 FL
MONOCYTES # BLD AUTO: 0.57 K/UL
MONOCYTES NFR BLD AUTO: 6.1 %
NEUTROPHILS # BLD AUTO: 7.16 K/UL
NEUTROPHILS NFR BLD AUTO: 76.2 %
NONHDLC SERPL-MCNC: 78 MG/DL
PLATELET # BLD AUTO: 258 K/UL
POTASSIUM SERPL-SCNC: 4.4 MMOL/L
PROT SERPL-MCNC: 7 G/DL
RBC # BLD: 4.9 M/UL
RBC # FLD: 12 %
SODIUM SERPL-SCNC: 135 MMOL/L
TRIGL SERPL-MCNC: 52 MG/DL
TSH SERPL-ACNC: 0.89 UIU/ML
TTG IGA SER IA-ACNC: 13.2 U/ML
TTG IGA SER-ACNC: POSITIVE
TTG IGG SER IA-ACNC: 1.2 U/ML
TTG IGG SER IA-ACNC: NEGATIVE
WBC # FLD AUTO: 9.4 K/UL

## 2021-12-17 ENCOUNTER — NON-APPOINTMENT (OUTPATIENT)
Age: 26
End: 2021-12-17

## 2021-12-17 LAB
ALBUMIN SERPL ELPH-MCNC: 4.7 G/DL
ALP BLD-CCNC: 75 U/L
ALT SERPL-CCNC: 16 U/L
AST SERPL-CCNC: 21 U/L
BILIRUB DIRECT SERPL-MCNC: 0.3 MG/DL
BILIRUB INDIRECT SERPL-MCNC: 0.7 MG/DL
BILIRUB SERPL-MCNC: 1 MG/DL
PROT SERPL-MCNC: 7.1 G/DL

## 2022-01-04 ENCOUNTER — RX RENEWAL (OUTPATIENT)
Age: 27
End: 2022-01-04

## 2022-03-28 ENCOUNTER — APPOINTMENT (OUTPATIENT)
Dept: ENDOCRINOLOGY | Facility: CLINIC | Age: 27
End: 2022-03-28
Payer: COMMERCIAL

## 2022-03-28 ENCOUNTER — RESULT CHARGE (OUTPATIENT)
Age: 27
End: 2022-03-28

## 2022-03-28 LAB — HBA1C MFR BLD HPLC: 6.8

## 2022-03-28 PROCEDURE — G0108 DIAB MANAGE TRN  PER INDIV: CPT

## 2022-03-28 PROCEDURE — 83036 HEMOGLOBIN GLYCOSYLATED A1C: CPT | Mod: QW

## 2022-07-25 ENCOUNTER — APPOINTMENT (OUTPATIENT)
Dept: ENDOCRINOLOGY | Facility: CLINIC | Age: 27
End: 2022-07-25

## 2022-07-25 PROCEDURE — 99448 NTRPROF PH1/NTRNET/EHR 21-30: CPT

## 2022-07-26 NOTE — HISTORY OF PRESENT ILLNESS
[FreeTextEntry1] : Patient is a 31-year-old woman here for type 1 diabetes management via telephone.\par \par Patient is using Medtronic pump since 2007.  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  She did not like it.\par \par She is currently utilizing freestyle alena 14 days which she really likes.\par \par Monitor glucose frequently during the day, more than 4 times, reports mostly at goal.  Except for some postprandial highs before dinner.  Otherwise doing fairly well.\par \par Currently on insulin pump with Medtronic.  Pump settings as below.\par \par Novolog inuslin. \par Start Time: 0:00 Basal: 1.15 units/hour \par Start Time: 0500 Basal: 1.1 unit/shour\par Start Time: 0800 Basal: 1.05 units/hour \par Start Time: 1500 Basal: 1.30 unit/hr. \par Start Time: 1800 Basal: 1.3 units/hour \par Total 21.8 units/day after change. \par Carbohydate ratio:\par 0:00 10\par 14:00 8 \par Correction Insulin: (Blood Glucose Minus Target) divided by sensitivity factor \par BG Target = 100-115 \par Insulin Sensitivity Factor = 55 \par \par \par

## 2022-07-26 NOTE — ASSESSMENT
[FreeTextEntry1] : Ms. IVANA CHANEY is 26 year old woman w/ Type 1 DM and celiac disease: \par \par 1. Type 1 DM\par We will try to download her Celly sensor information.\par We will check A1c level today\par Novolog inuslin. \par Start Time: 0:00 Basal: 1.15 units/hour \par Start Time: 0500 Basal: 1.1 unit/shour\par Start Time: 0800 Basal: 1.05 units/hour \par Start Time: 1500 Basal: 1.30 unit/hr. \par Start Time: 1800 Basal: 1.3 units/hour \par Total 21.8 units/day after change. \par Carbohydate ratio:\par 0:00 10 grams per unit\par 14:00 8 grams per unit\par Correction Insulin: (Blood Glucose Minus Target) divided by sensitivity factor \par BG Target = 100-115 \par Insulin Sensitivity Factor \par 12 AM to 12 AM: 55 mg/dl/unit \par \par Diabetic foot exam Up to date \par Diabetic eye exam up to date, due for summer 2021, will schedule an appointment soon. \par \par \par 2.  Autoimmune disease screening\par TSH, free T4 within normal limits in June 2020.  TPO antibody negative.  Can monitor once yearly.\par \par 3.  Celiac disease\par Stable.  Is following up with gastroenterologist.\par \par 4.  Vitamin D deficiency\par Continue with vitamin D supplement OTC 1000 IU daily.\par Will monitor level today.  \par \par Follow-up in 6 months.\par Call the office if any hyper or hypoglycemia.  Contact the office of any diabetic supplies as needed.\par Patient works at a pharmacy.\par Patient states that she currently has all supplies.\par Still not interested in Dexcom G6 sensor currently.\par \par

## 2022-08-03 LAB
25(OH)D3 SERPL-MCNC: 46.3 NG/ML
ALBUMIN SERPL ELPH-MCNC: 4.4 G/DL
ALP BLD-CCNC: 68 U/L
ALT SERPL-CCNC: 15 U/L
ANION GAP SERPL CALC-SCNC: 12 MMOL/L
AST SERPL-CCNC: 17 U/L
BILIRUB SERPL-MCNC: 1.2 MG/DL
BUN SERPL-MCNC: 10 MG/DL
CALCIUM SERPL-MCNC: 9.6 MG/DL
CHLORIDE SERPL-SCNC: 99 MMOL/L
CHOLEST SERPL-MCNC: 155 MG/DL
CO2 SERPL-SCNC: 25 MMOL/L
CREAT SERPL-MCNC: 0.7 MG/DL
CREAT SPEC-SCNC: 65 MG/DL
EGFR: 123 ML/MIN/1.73M2
ESTIMATED AVERAGE GLUCOSE: 151 MG/DL
GLUCOSE SERPL-MCNC: 239 MG/DL
HBA1C MFR BLD HPLC: 6.9 %
HDLC SERPL-MCNC: 80 MG/DL
LDLC SERPL CALC-MCNC: 63 MG/DL
MICROALBUMIN 24H UR DL<=1MG/L-MCNC: <1.2 MG/DL
MICROALBUMIN/CREAT 24H UR-RTO: NORMAL MG/G
NONHDLC SERPL-MCNC: 75 MG/DL
POTASSIUM SERPL-SCNC: 4.2 MMOL/L
PROT SERPL-MCNC: 6.6 G/DL
SODIUM SERPL-SCNC: 135 MMOL/L
TRIGL SERPL-MCNC: 60 MG/DL
TTG IGA SER IA-ACNC: 17.6 U/ML
TTG IGA SER-ACNC: POSITIVE
TTG IGG SER IA-ACNC: 4.1 U/ML
TTG IGG SER IA-ACNC: NEGATIVE

## 2022-10-16 ENCOUNTER — NON-APPOINTMENT (OUTPATIENT)
Age: 27
End: 2022-10-16

## 2023-01-03 ENCOUNTER — APPOINTMENT (OUTPATIENT)
Dept: ENDOCRINOLOGY | Facility: CLINIC | Age: 28
End: 2023-01-03
Payer: COMMERCIAL

## 2023-01-03 VITALS
OXYGEN SATURATION: 99 % | BODY MASS INDEX: 22.47 KG/M2 | HEART RATE: 72 BPM | HEIGHT: 61 IN | DIASTOLIC BLOOD PRESSURE: 80 MMHG | WEIGHT: 119 LBS | SYSTOLIC BLOOD PRESSURE: 120 MMHG

## 2023-01-03 LAB — HBA1C MFR BLD HPLC: 6.8

## 2023-01-03 PROCEDURE — 99214 OFFICE O/P EST MOD 30 MIN: CPT | Mod: 25

## 2023-01-03 PROCEDURE — 83036 HEMOGLOBIN GLYCOSYLATED A1C: CPT | Mod: QW

## 2023-01-03 NOTE — ASSESSMENT
[FreeTextEntry1] : Ms. IVANA CHANEY is 26 year old woman w/ Type 1 DM and celiac disease: \par \par 1. Type 1 DM\par Currently not interested in switching to Dexcom G6 sensor.\par Is very happy with freestyle alena sensor.\par Does not want to switch to a closed-loop system yet.\par Will be willing to look into it.\par Likely will like tandem with control IQ algorithm.  But will wait till Dexcom G7.  \par MiniMed 630 G\par Basal 1\par Midnight 1.15 units/h\par Time 0500 1.1 units/h\par Time 8 AM 1.05 units/h\par Time 1500 1.3 units/h\par \par 24-hour total 28.1 units/h\par \par Carbohydrate ratio\par Midnight 10 g/unit\par 1408 g/unit\par \par Insulin sensitivity\par Midnight 55 mg/dL/unit.\par \par Glucose target\par Midnight 100-150 mg/dL\par \par Active insulin time 4 units\par Maximum bolus 10 units\par \par Diabetic foot exam Up to date \par Ophthalmology: Up-to-date.\par \par \par 2.  Autoimmune disease screening\par TFT within normal limits this year.\par Continue to monitor annually.\par \par 3.  Celiac disease\par Stable.  Is following up with gastroenterologist.\par \par 4.  Vitamin D deficiency\par Continue with vitamin D supplement OTC 1000 IU daily.\par Monitor level annually\par \par Call the office if any hyper or hypoglycemia.  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

## 2023-01-03 NOTE — HISTORY OF PRESENT ILLNESS
[FreeTextEntry1] : 27-year-old woman here for type 1 diabetes follow-up.\par \par Patient is using Medtronic pump since 2007.  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  She did not like it.\par \par She is currently utilizing freestyle alena 14 days which she really likes.\par \par Monitor glucose frequently during the day, more than 4 times, reports mostly at goal.  Except for some postprandial highs before dinner.  Otherwise doing fairly well.\par \par Currently on insulin pump with Medtronic.  Pump settings as below.\par A1c 6.8% today.\par Glucometer download shows hypoglycemia in the morning.  Was happening for a while but now had resolved.  Unclear the etiology. [Continuous Glucose Monitoring] : Continuous Glucose Monitoring: Yes [Deisy] : Deisy

## 2023-01-03 NOTE — REASON FOR VISIT
Samples Given: Moisturizers provided Detail Level: Zone [Follow - Up] : a follow-up visit [DM Type 1] : DM Type 1

## 2023-01-20 ENCOUNTER — RX RENEWAL (OUTPATIENT)
Age: 28
End: 2023-01-20

## 2023-04-29 ENCOUNTER — NON-APPOINTMENT (OUTPATIENT)
Age: 28
End: 2023-04-29

## 2023-05-01 ENCOUNTER — APPOINTMENT (OUTPATIENT)
Dept: ORTHOPEDIC SURGERY | Facility: CLINIC | Age: 28
End: 2023-05-01
Payer: COMMERCIAL

## 2023-05-01 ENCOUNTER — NON-APPOINTMENT (OUTPATIENT)
Age: 28
End: 2023-05-01

## 2023-05-01 VITALS — WEIGHT: 120 LBS | HEIGHT: 61 IN | BODY MASS INDEX: 22.66 KG/M2

## 2023-05-01 DIAGNOSIS — M54.2 CERVICALGIA: ICD-10-CM

## 2023-05-01 DIAGNOSIS — S13.9XXA SPRAIN OF JOINTS AND LIGAMENTS OF UNSPECIFIED PARTS OF NECK, INITIAL ENCOUNTER: ICD-10-CM

## 2023-05-01 PROCEDURE — 72040 X-RAY EXAM NECK SPINE 2-3 VW: CPT

## 2023-05-01 PROCEDURE — 99203 OFFICE O/P NEW LOW 30 MIN: CPT

## 2023-05-01 NOTE — PHYSICAL EXAM
[de-identified] : General: No acute distress\par Mental: Alert and oriented x3\par Eyes: Conjunctivitis not seen\par Chest: Symmetric chest rise, no audible wheezing\par Skin: Bilateral lower extremities absent from rashes and ulcers\par Abdomen: No distention\par \par Cervical spine: Intact skin, no swelling, no ecchymosis\par Nontender at the midline, no step-offs\par Mildly tender at the paraspinal muscles on the left, nontender on the right\par 5/5 muscle strength throughout the bilateral upper extremities\par 2/2 sensation throughout the bilateral upper extremities\par Left shoulder full and painless range of motion, no impingement signs [de-identified] : AP and lateral of the cervical spine today show neutral alignment, well-maintained disc space height and vertebral body height, no degenerative findings, no fractures or dislocations.

## 2023-05-01 NOTE — DISCUSSION/SUMMARY
[de-identified] : 28-year-old female with cervical neck paraspinal muscle strain.  There is no concern for spinal nerve impingement.  Discussed management of this with her including use of ice, heat, stretching, physical therapy, Tylenol or anti-inflammatories as needed.  She was given a spine conditioning packet today.  Follow-up as needed.

## 2023-05-01 NOTE — HISTORY OF PRESENT ILLNESS
[de-identified] : 28-year-old female presents with 1 week of moderate pain along the left side of the neck.  No associated injury.  This started after waking up from sleeping and she believes she slept in an awkward position.  The pain is not in the midline, does not involve the right side.  There is some radiation along the superior shoulder, but there is no radiation distal to the shoulder.  She denies any weakness in the upper extremity, and denies numbness or tingling.  The pain is worsened with neck flexion and extension, and improves with use of a heating pad.  She has had 2 other similar episodes over the last 6 months that resolved after approximately 1 week.  Over the last 2 days the pain has improved and is now mild.\par She works as a pharmacy technician.

## 2023-07-10 ENCOUNTER — APPOINTMENT (OUTPATIENT)
Dept: ENDOCRINOLOGY | Facility: CLINIC | Age: 28
End: 2023-07-10
Payer: COMMERCIAL

## 2023-07-10 VITALS
HEIGHT: 61 IN | OXYGEN SATURATION: 99 % | SYSTOLIC BLOOD PRESSURE: 110 MMHG | HEART RATE: 67 BPM | WEIGHT: 118 LBS | DIASTOLIC BLOOD PRESSURE: 70 MMHG | BODY MASS INDEX: 22.28 KG/M2

## 2023-07-10 DIAGNOSIS — Z96.41 PRESENCE OF INSULIN PUMP (EXTERNAL) (INTERNAL): ICD-10-CM

## 2023-07-10 PROCEDURE — 99214 OFFICE O/P EST MOD 30 MIN: CPT

## 2023-07-11 LAB
25(OH)D3 SERPL-MCNC: 30.6 NG/ML
ALBUMIN SERPL ELPH-MCNC: 4.9 G/DL
ALP BLD-CCNC: 69 U/L
ALT SERPL-CCNC: 14 U/L
ANION GAP SERPL CALC-SCNC: 12 MMOL/L
AST SERPL-CCNC: 20 U/L
BILIRUB SERPL-MCNC: 1.2 MG/DL
BUN SERPL-MCNC: 6 MG/DL
CALCIUM SERPL-MCNC: 9.9 MG/DL
CHLORIDE SERPL-SCNC: 100 MMOL/L
CHOLEST SERPL-MCNC: 194 MG/DL
CO2 SERPL-SCNC: 26 MMOL/L
CREAT SERPL-MCNC: 0.66 MG/DL
CREAT SPEC-SCNC: 60 MG/DL
EGFR: 124 ML/MIN/1.73M2
GLUCOSE SERPL-MCNC: 124 MG/DL
HDLC SERPL-MCNC: 109 MG/DL
LDLC SERPL CALC-MCNC: 78 MG/DL
MICROALBUMIN 24H UR DL<=1MG/L-MCNC: <1.2 MG/DL
MICROALBUMIN/CREAT 24H UR-RTO: NORMAL MG/G
NONHDLC SERPL-MCNC: 85 MG/DL
POTASSIUM SERPL-SCNC: 4.6 MMOL/L
PROT SERPL-MCNC: 7.3 G/DL
SODIUM SERPL-SCNC: 137 MMOL/L
TRIGL SERPL-MCNC: 32 MG/DL
TSH SERPL-ACNC: 1.29 UIU/ML

## 2023-07-12 ENCOUNTER — NON-APPOINTMENT (OUTPATIENT)
Age: 28
End: 2023-07-12

## 2023-07-12 NOTE — ASSESSMENT
[FreeTextEntry1] : Ms. IVANA CHANEY is 26 year old woman w/ Type 1 DM and celiac disease: \par \par 1. Type 1 DM\par Patient is currently using freestyle 14 alena sensor.\par She will continue to tandem with control IQ algorithm versus OmniPod 5 versus beta bionics closed-loop insulin pump.  For now she would like to continue with Medtronic pump.  She will not upgrade for now. \par Currently not interested in switching to Dexcom G6 sensor.\par Is very happy with freestyle alena sensor.\par Does not want to switch to a closed-loop system yet.\par Will be willing to look into it.\par Likely will like tandem with control IQ algorithm.  But will wait till Dexcom G7.  \par Basal 1\par Midnight 1.15 units/h\par Time 0500 1.1 units/h\par Time 8 AM 1.05 units/h\par Time 1500 1.3 units/h\par \par 24-hour total 28.1 units/h\par \par Carbohydrate ratio\par Midnight 10 g/unit\par 2 pm 8 g/unit\par \par Insulin sensitivity\par Midnight 55 mg/dL/unit.\par \par Glucose target\par Midnight 100-150 mg/dL\par \par Active insulin time 4 units\par Maximum bolus 10 units\par \par Diabetic foot exam Up to date \par Ophthalmology: Up-to-date.\par \par \par 2.  Autoimmune disease screening\par TFT within normal limits this year.\par Continue to monitor annually.\par \par 3.  Celiac disease\par Stable.  Is following up with gastroenterologist.\par \par 4.  Vitamin D deficiency\par Continue with vitamin D supplement OTC 1000 IU daily.\par Monitor level annually\par \par Call the office if any hyper or hypoglycemia.  Contact the office of any diabetic supplies as needed.\par Patient works at a pharmacy.\par Patient states that she currently has all supplies.\par \par \par Follow-up in 6 months.

## 2023-07-12 NOTE — PHYSICAL EXAM
[Alert] : alert [Well Nourished] : well nourished [No Acute Distress] : no acute distress [Well Developed] : well developed [Normal Sclera/Conjunctiva] : normal sclera/conjunctiva [EOMI] : extra ocular movement intact [No Proptosis] : no proptosis [Normal Oropharynx] : the oropharynx was normal [Thyroid Not Enlarged] : the thyroid was not enlarged [No Thyroid Nodules] : no palpable thyroid nodules [No Respiratory Distress] : no respiratory distress [No Accessory Muscle Use] : no accessory muscle use [Clear to Auscultation] : lungs were clear to auscultation bilaterally [Normal S1, S2] : normal S1 and S2 [Normal Rate] : heart rate was normal [Regular Rhythm] : with a regular rhythm [No Edema] : no peripheral edema [Pedal Pulses Normal] : the pedal pulses are present [Normal Bowel Sounds] : normal bowel sounds [Not Tender] : non-tender [Not Distended] : not distended [Soft] : abdomen soft [Normal Anterior Cervical Nodes] : no anterior cervical lymphadenopathy [No Spinal Tenderness] : no spinal tenderness [Spine Straight] : spine straight [No Stigmata of Cushings Syndrome] : no stigmata of Cushings Syndrome [Normal Strength/Tone] : muscle strength and tone were normal [Normal Gait] : normal gait [No Rash] : no rash [Acanthosis Nigricans] : no acanthosis nigricans [Normal Reflexes] : deep tendon reflexes were 2+ and symmetric [No Tremors] : no tremors [Oriented x3] : oriented to person, place, and time

## 2023-07-12 NOTE — HISTORY OF PRESENT ILLNESS
[FreeTextEntry1] : 27-year-old woman here for type 1 diabetes follow-up.\par \par Patient is using Medtronic pump since 2007.  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  She did not like it.\par \par She is currently utilizing freestyle alena 14 days which she really likes.\par \par Monitor glucose frequently during the day, more than 4 times, reports mostly at goal.  Except for some postprandial highs before dinner.  Otherwise doing fairly well.\par \par Currently on insulin pump with Medtronic.  Pump settings as below.\par A1c 6.8% today.\par Glucometer download shows hypoglycemia in the morning.  Was happening for a while but now had resolved.  Unclear the etiology.

## 2023-07-13 ENCOUNTER — NON-APPOINTMENT (OUTPATIENT)
Age: 28
End: 2023-07-13

## 2023-08-24 ENCOUNTER — RX RENEWAL (OUTPATIENT)
Age: 28
End: 2023-08-24

## 2023-08-24 RX ORDER — GLUCAGON 3 MG/1
3 POWDER NASAL
Qty: 1 | Refills: 1 | Status: ACTIVE | COMMUNITY
Start: 2020-06-22 | End: 1900-01-01

## 2024-02-05 ENCOUNTER — RX RENEWAL (OUTPATIENT)
Age: 29
End: 2024-02-05

## 2024-02-05 RX ORDER — INSULIN ASPART 100 [IU]/ML
100 INJECTION, SOLUTION INTRAVENOUS; SUBCUTANEOUS
Qty: 30 | Refills: 4 | Status: ACTIVE | COMMUNITY
Start: 2023-01-20 | End: 1900-01-01

## 2024-02-06 ENCOUNTER — APPOINTMENT (OUTPATIENT)
Dept: ENDOCRINOLOGY | Facility: CLINIC | Age: 29
End: 2024-02-06
Payer: COMMERCIAL

## 2024-02-06 VITALS
DIASTOLIC BLOOD PRESSURE: 80 MMHG | HEIGHT: 61 IN | SYSTOLIC BLOOD PRESSURE: 120 MMHG | BODY MASS INDEX: 22.66 KG/M2 | HEART RATE: 64 BPM | OXYGEN SATURATION: 98 % | WEIGHT: 120 LBS

## 2024-02-06 DIAGNOSIS — R79.89 OTHER SPECIFIED ABNORMAL FINDINGS OF BLOOD CHEMISTRY: ICD-10-CM

## 2024-02-06 DIAGNOSIS — K90.0 CELIAC DISEASE: ICD-10-CM

## 2024-02-06 LAB — HBA1C MFR BLD HPLC: 7.1

## 2024-02-06 PROCEDURE — 99214 OFFICE O/P EST MOD 30 MIN: CPT

## 2024-02-06 PROCEDURE — G2211 COMPLEX E/M VISIT ADD ON: CPT

## 2024-02-06 PROCEDURE — 83036 HEMOGLOBIN GLYCOSYLATED A1C: CPT | Mod: QW

## 2024-02-06 RX ORDER — URINE ACETONE TEST STRIPS
STRIP MISCELLANEOUS
Qty: 1 | Refills: 0 | Status: ACTIVE | COMMUNITY
Start: 2024-02-06 | End: 1900-01-01

## 2024-02-06 NOTE — HISTORY OF PRESENT ILLNESS
[FreeTextEntry1] : 29-year-old woman here for type 1 diabetes follow-up.  Patient is using Medtronic pump since 2007.  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  She is up-to-date up ophthalmology active issue with her eyes.  She had tried Dexcom sensor in the past, however, they of older generation.  She did not like it.  She is currently utilizing freestyle alena 14 days which she really likes.  Monitor glucose frequently during the day, more than 4 times, reports mostly at goal.  Except for some postprandial highs before dinner.  Otherwise doing fairly well.  Currently on insulin pump with Medtronic.  Pump settings as below. A1c 6.8% today. Glucometer download shows hypoglycemia in the morning.  Was happening for a while but now had resolved.  Unclear the etiology.

## 2024-02-06 NOTE — THERAPY
[TextEntry] : Insulin pump Medtronic 630 G Basal 24-hour total 27.8 units Midnight 1.15 units/h 5 AM 1.00 units/h 8 a.m. 1.05 units/h 3 PM 1.3 units/h 6 PM 1.3 units/h  Carbohydrate ratio Midnight 10 g/unit 2 PM 8 g/unit  Insulin sensitivity (milligrams per deciliter per unit Midnight: 55 mg/dL/unit  Target glucose Midnight 100-150 mg/dL  Active insulin time 4 hours.

## 2024-03-24 ENCOUNTER — RX RENEWAL (OUTPATIENT)
Age: 29
End: 2024-03-24

## 2024-03-24 RX ORDER — INSULIN GLARGINE 100 [IU]/ML
100 INJECTION, SOLUTION SUBCUTANEOUS
Qty: 5 | Refills: 0 | Status: ACTIVE | COMMUNITY
Start: 2019-07-15 | End: 1900-01-01

## 2024-04-16 RX ORDER — BLOOD-GLUCOSE METER, WIRELESS
W/DEVICE KIT MISCELLANEOUS
Qty: 1 | Refills: 0 | Status: DISCONTINUED | COMMUNITY
Start: 2018-11-21 | End: 2024-04-16

## 2024-04-16 RX ORDER — BLOOD-GLUCOSE METER, WIRELESS
W/DEVICE KIT MISCELLANEOUS
Qty: 1 | Refills: 0 | Status: DISCONTINUED | COMMUNITY
Start: 2019-07-18 | End: 2024-04-16

## 2024-04-16 RX ORDER — FLASH GLUCOSE SENSOR
KIT MISCELLANEOUS
Qty: 2 | Refills: 11 | Status: DISCONTINUED | COMMUNITY
Start: 2020-10-01 | End: 2024-04-16

## 2024-05-06 ENCOUNTER — APPOINTMENT (OUTPATIENT)
Dept: ENDOCRINOLOGY | Facility: CLINIC | Age: 29
End: 2024-05-06
Payer: COMMERCIAL

## 2024-05-06 PROCEDURE — G0108 DIAB MANAGE TRN  PER INDIV: CPT

## 2024-05-06 RX ORDER — BLOOD-GLUCOSE SENSOR
EACH MISCELLANEOUS
Qty: 9 | Refills: 3 | Status: ACTIVE | COMMUNITY
Start: 2024-02-06 | End: 1900-01-01

## 2024-07-01 ENCOUNTER — APPOINTMENT (OUTPATIENT)
Dept: ENDOCRINOLOGY | Facility: CLINIC | Age: 29
End: 2024-07-01
Payer: COMMERCIAL

## 2024-07-01 DIAGNOSIS — E10.9 TYPE 1 DIABETES MELLITUS W/OUT COMPLICATIONS: ICD-10-CM

## 2024-07-01 PROCEDURE — P0006: CPT

## 2024-07-08 ENCOUNTER — NON-APPOINTMENT (OUTPATIENT)
Age: 29
End: 2024-07-08

## 2024-08-12 ENCOUNTER — APPOINTMENT (OUTPATIENT)
Dept: ENDOCRINOLOGY | Facility: CLINIC | Age: 29
End: 2024-08-12
Payer: COMMERCIAL

## 2024-08-12 VITALS
DIASTOLIC BLOOD PRESSURE: 82 MMHG | BODY MASS INDEX: 23.22 KG/M2 | HEIGHT: 61 IN | OXYGEN SATURATION: 98 % | WEIGHT: 123 LBS | HEART RATE: 63 BPM | SYSTOLIC BLOOD PRESSURE: 120 MMHG

## 2024-08-12 DIAGNOSIS — K90.0 CELIAC DISEASE: ICD-10-CM

## 2024-08-12 DIAGNOSIS — E10.9 TYPE 1 DIABETES MELLITUS W/OUT COMPLICATIONS: ICD-10-CM

## 2024-08-12 DIAGNOSIS — R79.89 OTHER SPECIFIED ABNORMAL FINDINGS OF BLOOD CHEMISTRY: ICD-10-CM

## 2024-08-12 DIAGNOSIS — Z96.41 PRESENCE OF INSULIN PUMP (EXTERNAL) (INTERNAL): ICD-10-CM

## 2024-08-12 PROCEDURE — 83036 HEMOGLOBIN GLYCOSYLATED A1C: CPT | Mod: QW

## 2024-08-12 PROCEDURE — 99214 OFFICE O/P EST MOD 30 MIN: CPT

## 2024-08-12 NOTE — ASSESSMENT
[FreeTextEntry1] : Ms. IVANA CHANEY is 29 year old woman w/ Type 1 DM and celiac disease:   1. Type 1 DM Continue with current insulin settings as above A1C 5.4% 08/12/2024 A1C 7.1% Feb 2024 A1C 6.8% Jan 2024  - - - - - - - - - - - - - - - - - - - - - - - - - - PUMP SETTINGS Insulin pump setting 12 AM 1 units/h A.m. 1.05 units/h 3 PM 1.3 units/h 10 PM 1.1 units/h Total 26.65 per 24-hour  Target glucose Midnight 112 mg/dL  Insulin to carbohydrate ratio 12 AM 10 g/unit 2 PM 8 g/unit   ISF 12 AM 55 mg/dL/unit  Active insulin time 5 hours  2.  Autoimmune disease screening TFT within normal limits this year. Continue to monitor annually. Due Summer 2024  3.  Celiac disease Stable.  Is following up with gastroenterologist.   4.  Vitamin D deficiency Continue with Vitamin D supplements.  Check Vitamin D level today   FU in 6 months wtih me.  Sooner if any urgent issues come in Has Ketone Strips and educated in hypoglycemic protocol

## 2024-08-12 NOTE — HISTORY OF PRESENT ILLNESS
[FreeTextEntry1] : 29-year-old woman here for type 1 diabetes follow-up.  Patient is using Medtronic pump since 2007.  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  She switched to Tandem Tslim about 1 month ago in July 2024; she is using dexcom G7 sensors and she likes her current insulin pump and control IQ.    She is up-to-date up ophthalmology active issue with her eyes.  She recently got engaged and she's moving to [facility], she's starting a new job in Aug 2024, but will be travelling back here for her doctor's appointment with us.   No DM complications.  UTD with eye doctor and due in one year, no neuropathy.         [TextEntry] : Name: Guadalupe Choudhary YOB: 1995 Report Created: 08/12/2024 Date Range: Jul 30, 2024 - Aug 12, 2024  - - - - - - - - - - - - - - - - - - - - - - - - - - - SUMMARY - CGM  GMI (CGM):                               6.6% (48.1 mmol/mol) Average Glucose (CGM):                   136 mg/dL Median (CGM):                            125 mg/dL SD (CGM):                                49 mg/dL CV (CGM):                                35.9% % Time CGM Active:                       87.5% (12.2 days) Highest (CGM):                           353 mg/dL Lowest (CGM):                            LO mg/dL  CGM Average Daily Time in Range (mg/dL) % Readings < 54:                         1% % Readings < 70:                         3% % Readings :                       81% % Readings > 180:                        16% % Readings > 250:                        3%  SUMMARY - BG  Average Glucose (BG):          190 mg/dL Median (BG):                   202 mg/dL SD (BG):                       59 mg/dL Readings/Day:                  5.4 Highest (BG):                  353 mg/dL Lowest (BG):                   87 mg/dL  BG Average % in Range (mg/dL) % Readings < 54:               0% % Readings < 70:               0% % Readings :             41% % Readings > 180:              59% % Readings > 250:              15%

## 2024-08-13 LAB — HBA1C MFR BLD HPLC: 5.4

## 2025-03-03 ENCOUNTER — APPOINTMENT (OUTPATIENT)
Dept: ENDOCRINOLOGY | Facility: CLINIC | Age: 30
End: 2025-03-03
Payer: COMMERCIAL

## 2025-03-03 VITALS
DIASTOLIC BLOOD PRESSURE: 78 MMHG | OXYGEN SATURATION: 99 % | HEART RATE: 105 BPM | SYSTOLIC BLOOD PRESSURE: 124 MMHG | BODY MASS INDEX: 21.16 KG/M2 | WEIGHT: 112 LBS

## 2025-03-03 PROCEDURE — 83036 HEMOGLOBIN GLYCOSYLATED A1C: CPT | Mod: QW

## 2025-03-03 PROCEDURE — 99214 OFFICE O/P EST MOD 30 MIN: CPT

## 2025-03-03 PROCEDURE — 95251 CONT GLUC MNTR ANALYSIS I&R: CPT

## 2025-03-03 RX ORDER — BLOOD SUGAR DIAGNOSTIC
STRIP MISCELLANEOUS
Qty: 100 | Refills: 0 | Status: ACTIVE | COMMUNITY
Start: 2025-03-03

## 2025-03-06 LAB
ALBUMIN SERPL ELPH-MCNC: 4.6 G/DL
ALP BLD-CCNC: 69 U/L
ALT SERPL-CCNC: 14 U/L
ANION GAP SERPL CALC-SCNC: 13 MMOL/L
AST SERPL-CCNC: 19 U/L
BILIRUB SERPL-MCNC: 0.8 MG/DL
BUN SERPL-MCNC: 10 MG/DL
CALCIUM SERPL-MCNC: 9.3 MG/DL
CHLORIDE SERPL-SCNC: 99 MMOL/L
CHOLEST SERPL-MCNC: 190 MG/DL
CO2 SERPL-SCNC: 25 MMOL/L
CREAT SERPL-MCNC: 0.89 MG/DL
CREAT SPEC-SCNC: 363 MG/DL
EGFRCR SERPLBLD CKD-EPI 2021: 91 ML/MIN/1.73M2
GLIADIN IGA SER QL: 26.7 U/ML
GLIADIN IGG SER QL: 3 U/ML
GLIADIN PEPTIDE IGA SER-ACNC: POSITIVE
GLIADIN PEPTIDE IGG SER-ACNC: NEGATIVE
GLUCOSE SERPL-MCNC: 188 MG/DL
HBA1C MFR BLD HPLC: 5.9
HDLC SERPL-MCNC: 98 MG/DL
LDLC SERPL CALC-MCNC: 82 MG/DL
MICROALBUMIN 24H UR DL<=1MG/L-MCNC: 9.5 MG/DL
MICROALBUMIN/CREAT 24H UR-RTO: 26 MG/G
NONHDLC SERPL-MCNC: 92 MG/DL
POTASSIUM SERPL-SCNC: 3.8 MMOL/L
PROT SERPL-MCNC: 7.1 G/DL
SODIUM SERPL-SCNC: 137 MMOL/L
T4 FREE SERPL-MCNC: 1.1 NG/DL
TRIGL SERPL-MCNC: 49 MG/DL
TSH SERPL-ACNC: 1.79 UIU/ML
TTG IGA SER IA-ACNC: 24.6 U/ML
TTG IGA SER-ACNC: POSITIVE
TTG IGG SER IA-ACNC: <0.8 U/ML
TTG IGG SER IA-ACNC: NEGATIVE

## 2025-03-06 RX ORDER — INSULIN GLARGINE 300 U/ML
300 INJECTION, SOLUTION SUBCUTANEOUS
Qty: 1 | Refills: 0 | Status: ACTIVE | COMMUNITY
Start: 2025-03-06 | End: 1900-01-01